# Patient Record
Sex: FEMALE | Race: WHITE | NOT HISPANIC OR LATINO
[De-identification: names, ages, dates, MRNs, and addresses within clinical notes are randomized per-mention and may not be internally consistent; named-entity substitution may affect disease eponyms.]

---

## 2017-07-28 ENCOUNTER — APPOINTMENT (OUTPATIENT)
Dept: CT IMAGING | Facility: CLINIC | Age: 62
End: 2017-07-28
Payer: COMMERCIAL

## 2017-07-28 ENCOUNTER — OUTPATIENT (OUTPATIENT)
Dept: OUTPATIENT SERVICES | Facility: HOSPITAL | Age: 62
LOS: 1 days | End: 2017-07-28
Payer: COMMERCIAL

## 2017-07-28 DIAGNOSIS — Z00.8 ENCOUNTER FOR OTHER GENERAL EXAMINATION: ICD-10-CM

## 2017-07-28 PROCEDURE — 70486 CT MAXILLOFACIAL W/O DYE: CPT

## 2017-07-28 PROCEDURE — 70486 CT MAXILLOFACIAL W/O DYE: CPT | Mod: 26

## 2018-10-08 ENCOUNTER — INPATIENT (INPATIENT)
Facility: HOSPITAL | Age: 63
LOS: 6 days | Discharge: HOME | DRG: 871 | End: 2018-10-15
Attending: HOSPITALIST | Admitting: HOSPITALIST
Payer: COMMERCIAL

## 2018-10-08 VITALS
WEIGHT: 227.96 LBS | OXYGEN SATURATION: 95 % | DIASTOLIC BLOOD PRESSURE: 95 MMHG | HEIGHT: 63 IN | HEART RATE: 93 BPM | SYSTOLIC BLOOD PRESSURE: 158 MMHG | RESPIRATION RATE: 16 BRPM | TEMPERATURE: 99 F

## 2018-10-08 LAB
ALBUMIN SERPL ELPH-MCNC: 3.8 G/DL — SIGNIFICANT CHANGE UP (ref 3.3–5)
ALP SERPL-CCNC: 76 U/L — SIGNIFICANT CHANGE UP (ref 40–120)
ALT FLD-CCNC: 27 U/L — SIGNIFICANT CHANGE UP (ref 10–45)
ANION GAP SERPL CALC-SCNC: 10 MMOL/L — SIGNIFICANT CHANGE UP (ref 5–17)
AST SERPL-CCNC: 23 U/L — SIGNIFICANT CHANGE UP (ref 10–40)
BASOPHILS # BLD AUTO: 0 K/UL — SIGNIFICANT CHANGE UP (ref 0–0.2)
BASOPHILS NFR BLD AUTO: 0.2 % — SIGNIFICANT CHANGE UP (ref 0–2)
BILIRUB SERPL-MCNC: 0.5 MG/DL — SIGNIFICANT CHANGE UP (ref 0.2–1.2)
BUN SERPL-MCNC: 11 MG/DL — SIGNIFICANT CHANGE UP (ref 7–23)
CALCIUM SERPL-MCNC: 9 MG/DL — SIGNIFICANT CHANGE UP (ref 8.4–10.5)
CHLORIDE SERPL-SCNC: 102 MMOL/L — SIGNIFICANT CHANGE UP (ref 96–108)
CO2 SERPL-SCNC: 26 MMOL/L — SIGNIFICANT CHANGE UP (ref 22–31)
CREAT SERPL-MCNC: 0.83 MG/DL — SIGNIFICANT CHANGE UP (ref 0.5–1.3)
EOSINOPHIL # BLD AUTO: 0.2 K/UL — SIGNIFICANT CHANGE UP (ref 0–0.5)
EOSINOPHIL NFR BLD AUTO: 2.1 % — SIGNIFICANT CHANGE UP (ref 0–6)
GAS PNL BLDV: SIGNIFICANT CHANGE UP
GLUCOSE SERPL-MCNC: 91 MG/DL — SIGNIFICANT CHANGE UP (ref 70–99)
HCT VFR BLD CALC: 38.8 % — SIGNIFICANT CHANGE UP (ref 34.5–45)
HGB BLD-MCNC: 12.9 G/DL — SIGNIFICANT CHANGE UP (ref 11.5–15.5)
LYMPHOCYTES # BLD AUTO: 1.5 K/UL — SIGNIFICANT CHANGE UP (ref 1–3.3)
LYMPHOCYTES # BLD AUTO: 18.4 % — SIGNIFICANT CHANGE UP (ref 13–44)
MCHC RBC-ENTMCNC: 28.1 PG — SIGNIFICANT CHANGE UP (ref 27–34)
MCHC RBC-ENTMCNC: 33.3 GM/DL — SIGNIFICANT CHANGE UP (ref 32–36)
MCV RBC AUTO: 84.2 FL — SIGNIFICANT CHANGE UP (ref 80–100)
MONOCYTES # BLD AUTO: 0.8 K/UL — SIGNIFICANT CHANGE UP (ref 0–0.9)
MONOCYTES NFR BLD AUTO: 10.6 % — SIGNIFICANT CHANGE UP (ref 2–14)
NEUTROPHILS # BLD AUTO: 5.5 K/UL — SIGNIFICANT CHANGE UP (ref 1.8–7.4)
NEUTROPHILS NFR BLD AUTO: 68.8 % — SIGNIFICANT CHANGE UP (ref 43–77)
PLATELET # BLD AUTO: 283 K/UL — SIGNIFICANT CHANGE UP (ref 150–400)
POTASSIUM SERPL-MCNC: 3.7 MMOL/L — SIGNIFICANT CHANGE UP (ref 3.5–5.3)
POTASSIUM SERPL-SCNC: 3.7 MMOL/L — SIGNIFICANT CHANGE UP (ref 3.5–5.3)
PROT SERPL-MCNC: 8.3 G/DL — SIGNIFICANT CHANGE UP (ref 6–8.3)
RBC # BLD: 4.6 M/UL — SIGNIFICANT CHANGE UP (ref 3.8–5.2)
RBC # FLD: 13.4 % — SIGNIFICANT CHANGE UP (ref 10.3–14.5)
SODIUM SERPL-SCNC: 138 MMOL/L — SIGNIFICANT CHANGE UP (ref 135–145)
WBC # BLD: 8 K/UL — SIGNIFICANT CHANGE UP (ref 3.8–10.5)
WBC # FLD AUTO: 8 K/UL — SIGNIFICANT CHANGE UP (ref 3.8–10.5)

## 2018-10-08 PROCEDURE — 99285 EMERGENCY DEPT VISIT HI MDM: CPT

## 2018-10-08 PROCEDURE — 71046 X-RAY EXAM CHEST 2 VIEWS: CPT | Mod: 26

## 2018-10-08 RX ORDER — ACETAMINOPHEN 500 MG
650 TABLET ORAL ONCE
Qty: 0 | Refills: 0 | Status: DISCONTINUED | OUTPATIENT
Start: 2018-10-08 | End: 2018-10-08

## 2018-10-08 RX ORDER — SODIUM CHLORIDE 9 MG/ML
1000 INJECTION INTRAMUSCULAR; INTRAVENOUS; SUBCUTANEOUS ONCE
Qty: 0 | Refills: 0 | Status: COMPLETED | OUTPATIENT
Start: 2018-10-08 | End: 2018-10-08

## 2018-10-08 RX ORDER — ACETAMINOPHEN 500 MG
650 TABLET ORAL ONCE
Qty: 0 | Refills: 0 | Status: COMPLETED | OUTPATIENT
Start: 2018-10-08 | End: 2018-10-08

## 2018-10-08 RX ORDER — SODIUM CHLORIDE 9 MG/ML
1000 INJECTION INTRAMUSCULAR; INTRAVENOUS; SUBCUTANEOUS ONCE
Qty: 0 | Refills: 0 | Status: DISCONTINUED | OUTPATIENT
Start: 2018-10-08 | End: 2018-10-08

## 2018-10-08 RX ADMIN — Medication 650 MILLIGRAM(S): at 22:25

## 2018-10-08 RX ADMIN — SODIUM CHLORIDE 1000 MILLILITER(S): 9 INJECTION INTRAMUSCULAR; INTRAVENOUS; SUBCUTANEOUS at 22:24

## 2018-10-08 RX ADMIN — Medication 100 MILLIGRAM(S): at 23:09

## 2018-10-08 NOTE — ED PROVIDER NOTE - PROGRESS NOTE DETAILS
Spoke to hospitalist to endorse admission, unable to reach Dr. Baker by phone for past two hours. Hospitalist deferring admission until they attempt to readh Dr. Baker themselves x 1.

## 2018-10-08 NOTE — ED PROVIDER NOTE - ATTENDING CONTRIBUTION TO CARE
61 y/o female with the above documented history and HPI who on exam appears well and comfortable. VSs noted, neck supple, lungs CTA, cardiac sounds s/ audible m/r/g, abdomen soft, NT/ND, extremities s/ asymmetry, calf ttp or palpable cord, skin s/ rash or edema and neurologically intact. Screening diagnostic studies obtained. Presentation c/w CAP. Curb-65 score suggest out-patient Tx but she did take 4 days of Augmentin (had at home for some other reason from approx 6 months ago) and is not improved and says she feels SOB when she walks. We will follow her remaining studies, reassess, ambulate and treat/dispo accordingly.

## 2018-10-08 NOTE — ED ADULT NURSE NOTE - OBJECTIVE STATEMENT
63y/o female walked into ED a&ox3 c/o SOB. Patient reports since Friday she has been having productive cough, SOB with exertion and chest discomfort. Also reports intermittent fevers which she has been taking Tylenol for every 6hours. Reports she has been taking left over amoxicillin that she had at home because she thought she may have a pneumonia. States she went to Urgent care yesterday where she was diagnosed with left lower lobe pneumonia. Sent here for further eval. Currently c/o pain to right upper back, cough and chest discomfort when she coughs. Denies dizziness, abd pain, vomiting, diarrhea, urinary symptoms, sick contacts, recent travel. Wheezing heard b/l to lungs. Abd soft, nontender, nondistended. MD at bedside for eval. 61y/o female walked into ED a&ox3 c/o SOB. Patient reports since Friday she has been having productive cough, SOB with exertion and chest discomfort. Also reports intermittent fevers which she has been taking Tylenol for every 6hours. Reports she has been taking left over amoxicillin that she had at home because she thought she may have a pneumonia. States she went to Urgent care yesterday where she was diagnosed with left lower lobe pneumonia. Sent here for further eval. Currently c/o pain to right upper back, cough and chest discomfort when she coughs. Denies dizziness, abd pain, vomiting, diarrhea, urinary symptoms, recent travel. Wheezing heard b/l to lungs. Abd soft, nontender, nondistended. MD at bedside for eval.

## 2018-10-08 NOTE — ED PROVIDER NOTE - SHIFT CHANGE DETAILS
Awaiting CXR and clinical reassessment. Regardless of her CXR findings, if she experiences BOLDEN and/or her SpO2 drops on ambulation, she may be admitted.

## 2018-10-09 DIAGNOSIS — J18.9 PNEUMONIA, UNSPECIFIED ORGANISM: ICD-10-CM

## 2018-10-09 DIAGNOSIS — J45.909 UNSPECIFIED ASTHMA, UNCOMPLICATED: ICD-10-CM

## 2018-10-09 DIAGNOSIS — A41.9 SEPSIS, UNSPECIFIED ORGANISM: ICD-10-CM

## 2018-10-09 DIAGNOSIS — G47.33 OBSTRUCTIVE SLEEP APNEA (ADULT) (PEDIATRIC): ICD-10-CM

## 2018-10-09 LAB — RAPID RVP RESULT: SIGNIFICANT CHANGE UP

## 2018-10-09 PROCEDURE — 12345: CPT | Mod: NC

## 2018-10-09 PROCEDURE — 99223 1ST HOSP IP/OBS HIGH 75: CPT

## 2018-10-09 PROCEDURE — 71250 CT THORAX DX C-: CPT | Mod: 26

## 2018-10-09 RX ORDER — ACETAMINOPHEN 500 MG
650 TABLET ORAL EVERY 6 HOURS
Qty: 0 | Refills: 0 | Status: DISCONTINUED | OUTPATIENT
Start: 2018-10-09 | End: 2018-10-15

## 2018-10-09 RX ORDER — IPRATROPIUM/ALBUTEROL SULFATE 18-103MCG
3 AEROSOL WITH ADAPTER (GRAM) INHALATION EVERY 4 HOURS
Qty: 0 | Refills: 0 | Status: DISCONTINUED | OUTPATIENT
Start: 2018-10-09 | End: 2018-10-15

## 2018-10-09 RX ORDER — BUDESONIDE AND FORMOTEROL FUMARATE DIHYDRATE 160; 4.5 UG/1; UG/1
2 AEROSOL RESPIRATORY (INHALATION)
Qty: 0 | Refills: 0 | Status: DISCONTINUED | OUTPATIENT
Start: 2018-10-09 | End: 2018-10-15

## 2018-10-09 RX ORDER — IPRATROPIUM/ALBUTEROL SULFATE 18-103MCG
3 AEROSOL WITH ADAPTER (GRAM) INHALATION EVERY 6 HOURS
Qty: 0 | Refills: 0 | Status: DISCONTINUED | OUTPATIENT
Start: 2018-10-09 | End: 2018-10-09

## 2018-10-09 RX ORDER — CEFTRIAXONE 500 MG/1
1 INJECTION, POWDER, FOR SOLUTION INTRAMUSCULAR; INTRAVENOUS ONCE
Qty: 0 | Refills: 0 | Status: COMPLETED | OUTPATIENT
Start: 2018-10-09 | End: 2018-10-09

## 2018-10-09 RX ORDER — ONDANSETRON 8 MG/1
4 TABLET, FILM COATED ORAL ONCE
Qty: 0 | Refills: 0 | Status: COMPLETED | OUTPATIENT
Start: 2018-10-09 | End: 2018-10-09

## 2018-10-09 RX ORDER — AZITHROMYCIN 500 MG/1
500 TABLET, FILM COATED ORAL ONCE
Qty: 0 | Refills: 0 | Status: COMPLETED | OUTPATIENT
Start: 2018-10-09 | End: 2018-10-09

## 2018-10-09 RX ORDER — ASPIRIN/CALCIUM CARB/MAGNESIUM 324 MG
81 TABLET ORAL DAILY
Qty: 0 | Refills: 0 | Status: DISCONTINUED | OUTPATIENT
Start: 2018-10-09 | End: 2018-10-15

## 2018-10-09 RX ADMIN — Medication 3 MILLILITER(S): at 15:04

## 2018-10-09 RX ADMIN — Medication 81 MILLIGRAM(S): at 12:03

## 2018-10-09 RX ADMIN — SODIUM CHLORIDE 1000 MILLILITER(S): 9 INJECTION INTRAMUSCULAR; INTRAVENOUS; SUBCUTANEOUS at 00:16

## 2018-10-09 RX ADMIN — Medication 3 MILLILITER(S): at 21:25

## 2018-10-09 RX ADMIN — AZITHROMYCIN 250 MILLIGRAM(S): 500 TABLET, FILM COATED ORAL at 01:07

## 2018-10-09 RX ADMIN — Medication 650 MILLIGRAM(S): at 00:15

## 2018-10-09 RX ADMIN — Medication 650 MILLIGRAM(S): at 12:33

## 2018-10-09 RX ADMIN — CEFTRIAXONE 100 GRAM(S): 500 INJECTION, POWDER, FOR SOLUTION INTRAMUSCULAR; INTRAVENOUS at 00:23

## 2018-10-09 RX ADMIN — Medication 3 MILLILITER(S): at 18:12

## 2018-10-09 RX ADMIN — Medication 650 MILLIGRAM(S): at 06:36

## 2018-10-09 RX ADMIN — CEFTRIAXONE 1 GRAM(S): 500 INJECTION, POWDER, FOR SOLUTION INTRAMUSCULAR; INTRAVENOUS at 01:07

## 2018-10-09 RX ADMIN — BUDESONIDE AND FORMOTEROL FUMARATE DIHYDRATE 2 PUFF(S): 160; 4.5 AEROSOL RESPIRATORY (INHALATION) at 22:37

## 2018-10-09 RX ADMIN — Medication 3 MILLILITER(S): at 11:48

## 2018-10-09 RX ADMIN — Medication 650 MILLIGRAM(S): at 03:42

## 2018-10-09 RX ADMIN — ONDANSETRON 4 MILLIGRAM(S): 8 TABLET, FILM COATED ORAL at 02:40

## 2018-10-09 RX ADMIN — Medication 650 MILLIGRAM(S): at 12:03

## 2018-10-09 RX ADMIN — AZITHROMYCIN 500 MILLIGRAM(S): 500 TABLET, FILM COATED ORAL at 02:41

## 2018-10-09 NOTE — H&P ADULT - PMH
Asthma, unspecified asthma severity, unspecified whether complicated, unspecified whether persistent    Bronchitis    JEFFRY (obstructive sleep apnea)

## 2018-10-09 NOTE — H&P ADULT - NSHPREVIEWOFSYSTEMS_GEN_ALL_CORE
REVIEW OF SYSTEMS:  CONSTITUTIONAL: +weakness. +fevers. +chills. No rigors. No weight loss. No poor appetite.  EYES: No visual changes. No eye pain.  ENT: No hearing difficulty. No vertigo. No dysphagia. No Sinusitis/rhinorrhea.  NECK: No pain. No stiffness/rigidity.  CARDIAC: No chest pain. No palpitations.  RESPIRATORY: +cough. +SOB. +hemoptysis.  GASTROINTESTINAL: No abdominal pain. +nausea. No vomiting. No hematemesis. +diarrhea. No constipation. No melena. No hematochezia.  GENITOURINARY: No dysuria. No frequency. No hesitancy. No hematuria.  NEUROLOGICAL: No numbness/tingling. No focal weakness. No incontinence. No headache.  BACK: +back pain.  EXTREMITIES: +lower extremity edema. Full ROM.  SKIN: No rashes. No itching. No other lesions.  PSYCHIATRIC: No depression. No anxiety. No SI/HI.  ALLERGIC: No lip swelling. No hives.  All other review of systems is negative unless indicated above.  Unless indicated above, unable to assess ROS 2/2 REVIEW OF SYSTEMS:  CONSTITUTIONAL: +weakness. +fevers. +chills. No rigors. No weight loss. No poor appetite.  EYES: No visual changes. No eye pain.  ENT: No hearing difficulty. No vertigo. No dysphagia. No Sinusitis/rhinorrhea.  NECK: No pain. No stiffness/rigidity.  CARDIAC: No chest pain. No palpitations.  RESPIRATORY: +cough. +SOB. +hemoptysis.  GASTROINTESTINAL: No abdominal pain. +nausea. No vomiting. No hematemesis. +diarrhea. No constipation. No melena. No hematochezia.  GENITOURINARY: No dysuria. No frequency. No hesitancy. No hematuria.  NEUROLOGICAL: No numbness/tingling. No focal weakness. No incontinence. No headache.  BACK: +back pain pleuritic  EXTREMITIES: +lower extremity edema. Full ROM.  SKIN: No rashes. No itching. No other lesions.  PSYCHIATRIC: No depression. No anxiety. No SI/HI.  ALLERGIC: No lip swelling. No hives.  All other review of systems is negative unless indicated above.  Unless indicated above, unable to assess ROS 2/2

## 2018-10-09 NOTE — H&P ADULT - PROBLEM SELECTOR PLAN 1
- s/p 5 doses augmentin, 1 dose azithro/ceft  - cont levaquin starting tomorrow  - CT chest pending to eval chronic SOB, small hemoptysis, and pna  - small hemoptysis likely 2/2 PNA. monitor clinically

## 2018-10-09 NOTE — H&P ADULT - NSHPPHYSICALEXAM_GEN_ALL_CORE
PHYSICAL EXAM:   GENERAL: Alert. Not confused. No acute distress. Not thin. Not cachectic. +obese.  HEAD:  Atraumatic. Normocephalic.  EYES: EOMI. PERRLA. Normal conjunctiva/sclera.  ENT: Neck supple. No JVD. Moist oral mucosa. Not edentulous. No thrush.  LYMPH: Normal supraclavicular/cervical lymph nodes.   CARDIAC: Not tachy, Not tyra. Regular rhythm. Not irregularly irregular. S1. S2. No murmur. No rub. No distant heart sounds.  LUNG/CHEST: +expiratory wheezes b/l. No rales. No rhonchi.  ABDOMEN: Soft. No tenderness. No distension. No fluid wave. Normal bowel sounds.  BACK: No midline/vertebral tenderness. No flank tenderness.  VASCULAR: +2 b/l radial or ulnar pulses. Palpable DP pulses.  EXTREMITIES:  No clubbing. No cyanosis. +2 nonpitting bl LE edema. Moving all 4.  NEUROLOGY: A&Ox3. Non-focal exam. Cranial nerves intact. Normal speech. Sensation intact.  PSYCH: Normal behavior. Normal affect.  SKIN: No jaundice. No erythema. No rash/lesion.  Vascular Access:     ICU Vital Signs Last 24 Hrs  T(C): 37 (09 Oct 2018 03:49), Max: 37.1 (08 Oct 2018 17:36)  T(F): 98.6 (09 Oct 2018 03:49), Max: 98.8 (08 Oct 2018 17:36)  HR: 81 (09 Oct 2018 03:49) (81 - 93)  BP: 145/72 (09 Oct 2018 03:49) (144/93 - 158/95)  BP(mean): --  ABP: --  ABP(mean): --  RR: 19 (09 Oct 2018 03:49) (16 - 20)  SpO2: 95% (09 Oct 2018 03:49) (95% - 95%)      I&O's Summary

## 2018-10-09 NOTE — H&P ADULT - NSHPLABSRESULTS_GEN_ALL_CORE
Personally reviewed labs.   Personally reviewed imaging.                           12.9   8.0   )-----------( 283      ( 08 Oct 2018 22:26 )             38.8       10-08    138  |  102  |  11  ----------------------------<  91  3.7   |  26  |  0.83    Ca    9.0      08 Oct 2018 22:26    TPro  8.3  /  Alb  3.8  /  TBili  0.5  /  DBili  x   /  AST  23  /  ALT  27  /  AlkPhos  76  10-08            LIVER FUNCTIONS - ( 08 Oct 2018 22:26 )  Alb: 3.8 g/dL / Pro: 8.3 g/dL / ALK PHOS: 76 U/L / ALT: 27 U/L / AST: 23 U/L / GGT: x

## 2018-10-09 NOTE — H&P ADULT - PROBLEM SELECTOR PLAN 2
- end expiratory wheezes, currently in exacerbation vs reactive airway dz vs COPD exacerbation  - cont duonebs + inhaled steroids

## 2018-10-09 NOTE — H&P ADULT - NSHPSOCIALHISTORY_GEN_ALL_CORE
Social History:    Marital Status: ( x ) , (  ) Single, (  ) , (  ) , (  )   # of Children: 3  Lives with: (  ) alone, (  ) children, ( x ) spouse, (  ) parents, (  ) siblings, (  ) friends, (  ) other:   Occupation: pre-K teacher    Substance Use/Illicit Drugs: (  ) never used vs other:   Tobacco Usage: (  ) never smoked, ( x ) former smoker, (  ) current smoker and Total Pack-Years: 3 years  Last Alcohol Usage/Frequency/Amount/Withdrawal/Hx of Abuse:  plum wine once a week  Foreign travel/Animal exposure:

## 2018-10-09 NOTE — CHART NOTE - NSCHARTNOTEFT_GEN_A_CORE
62F c hx bronchitis, asthma, obesity, JEFFRY on CPAP, chronic cough and chronic BOLDEN, pw sob, fever, small hemoptysis.    Patient seen and examined. Started on Levaquin, continue with nebs. Reviewed CT chest, show LOUIS opacity, likely infectious.   Follow up sputum cultures. Follow up Echo given b/l lower ext edema and Dyspnea.   Will continue to follow.

## 2018-10-09 NOTE — H&P ADULT - HISTORY OF PRESENT ILLNESS
62F c hx bronchitis, asthma, obesity, JEFFRY on CPAP, chronic cough and chronic BOLDEN, pw sob, fever, small hemoptysis.    Pt states she has chronic SOB/BOLDEN and cough for 2 years, for which she saw a pulmonologist and a cardiologist, and unclear etiology of the BOLDEN. Pt states she's never had PNA before and never had a CT of her chest. Pt reports 4 days of increasing BOLDEN, cough above baseline, with subjective fevers. Reports taking 5 total doses of augmentin that she self-medicated, as she had antibiotics left over from another time. Reports 1 day of small productive, pink colored sputum. Also reports a few days of intermittent diarrhea. Denies CP, but reports pleuritic back pain.    VS: Tm 98.8, P 93, /73, R 20, 95% RA  in the ED, received azithro, ceftriaxone, Ns 1L, zofran, tessalon perle, hycodan

## 2018-10-09 NOTE — ED ADULT NURSE REASSESSMENT NOTE - NS ED NURSE REASSESS COMMENT FT1
Received report from TIM Alfred. Pt A&Ox3, resting, VS stable. Safety checked. No c/o pain or discomfort at this time. Comfort care provided. Pt encouraged to call for assist when needed. admitted, pending bed assignment

## 2018-10-09 NOTE — H&P ADULT - ASSESSMENT
62F c hx bronchitis, asthma, obesity, JEFFRY on CPAP, chronic cough and chronic BOLDEN, pw sepsis 2/2 LOUIS PNA

## 2018-10-10 LAB
GRAM STN FLD: SIGNIFICANT CHANGE UP
SPECIMEN SOURCE: SIGNIFICANT CHANGE UP

## 2018-10-10 PROCEDURE — 93306 TTE W/DOPPLER COMPLETE: CPT | Mod: 26

## 2018-10-10 PROCEDURE — 99233 SBSQ HOSP IP/OBS HIGH 50: CPT

## 2018-10-10 RX ADMIN — Medication 3 MILLILITER(S): at 09:17

## 2018-10-10 RX ADMIN — Medication 100 MILLIGRAM(S): at 13:40

## 2018-10-10 RX ADMIN — Medication 20 MILLIGRAM(S): at 21:30

## 2018-10-10 RX ADMIN — BUDESONIDE AND FORMOTEROL FUMARATE DIHYDRATE 2 PUFF(S): 160; 4.5 AEROSOL RESPIRATORY (INHALATION) at 05:36

## 2018-10-10 RX ADMIN — Medication 3 MILLILITER(S): at 17:34

## 2018-10-10 RX ADMIN — Medication 3 MILLILITER(S): at 01:20

## 2018-10-10 RX ADMIN — Medication 3 MILLILITER(S): at 05:36

## 2018-10-10 RX ADMIN — Medication 100 MILLIGRAM(S): at 21:27

## 2018-10-10 RX ADMIN — Medication 20 MILLIGRAM(S): at 13:40

## 2018-10-10 RX ADMIN — BUDESONIDE AND FORMOTEROL FUMARATE DIHYDRATE 2 PUFF(S): 160; 4.5 AEROSOL RESPIRATORY (INHALATION) at 17:34

## 2018-10-10 RX ADMIN — Medication 3 MILLILITER(S): at 21:28

## 2018-10-10 RX ADMIN — Medication 81 MILLIGRAM(S): at 12:11

## 2018-10-10 RX ADMIN — Medication 3 MILLILITER(S): at 13:40

## 2018-10-10 RX ADMIN — Medication 650 MILLIGRAM(S): at 23:37

## 2018-10-11 LAB
ANION GAP SERPL CALC-SCNC: 13 MMOL/L — SIGNIFICANT CHANGE UP (ref 5–17)
BASOPHILS # BLD AUTO: 0 K/UL — SIGNIFICANT CHANGE UP (ref 0–0.2)
BASOPHILS NFR BLD AUTO: 0.1 % — SIGNIFICANT CHANGE UP (ref 0–2)
BUN SERPL-MCNC: 19 MG/DL — SIGNIFICANT CHANGE UP (ref 7–23)
CALCIUM SERPL-MCNC: 9.9 MG/DL — SIGNIFICANT CHANGE UP (ref 8.4–10.5)
CHLORIDE SERPL-SCNC: 102 MMOL/L — SIGNIFICANT CHANGE UP (ref 96–108)
CO2 SERPL-SCNC: 25 MMOL/L — SIGNIFICANT CHANGE UP (ref 22–31)
CREAT SERPL-MCNC: 1.2 MG/DL — SIGNIFICANT CHANGE UP (ref 0.5–1.3)
CULTURE RESULTS: SIGNIFICANT CHANGE UP
EOSINOPHIL # BLD AUTO: 0 K/UL — SIGNIFICANT CHANGE UP (ref 0–0.5)
EOSINOPHIL NFR BLD AUTO: 0.3 % — SIGNIFICANT CHANGE UP (ref 0–6)
GLUCOSE SERPL-MCNC: 119 MG/DL — HIGH (ref 70–99)
HCT VFR BLD CALC: 38 % — SIGNIFICANT CHANGE UP (ref 34.5–45)
HGB BLD-MCNC: 12.6 G/DL — SIGNIFICANT CHANGE UP (ref 11.5–15.5)
LYMPHOCYTES # BLD AUTO: 1 K/UL — SIGNIFICANT CHANGE UP (ref 1–3.3)
LYMPHOCYTES # BLD AUTO: 6 % — LOW (ref 13–44)
MCHC RBC-ENTMCNC: 27.9 PG — SIGNIFICANT CHANGE UP (ref 27–34)
MCHC RBC-ENTMCNC: 33.2 GM/DL — SIGNIFICANT CHANGE UP (ref 32–36)
MCV RBC AUTO: 84 FL — SIGNIFICANT CHANGE UP (ref 80–100)
MONOCYTES # BLD AUTO: 0.7 K/UL — SIGNIFICANT CHANGE UP (ref 0–0.9)
MONOCYTES NFR BLD AUTO: 4.1 % — SIGNIFICANT CHANGE UP (ref 2–14)
NEUTROPHILS # BLD AUTO: 15.4 K/UL — HIGH (ref 1.8–7.4)
NEUTROPHILS NFR BLD AUTO: 89.5 % — HIGH (ref 43–77)
PLATELET # BLD AUTO: 350 K/UL — SIGNIFICANT CHANGE UP (ref 150–400)
POTASSIUM SERPL-MCNC: 4.3 MMOL/L — SIGNIFICANT CHANGE UP (ref 3.5–5.3)
POTASSIUM SERPL-SCNC: 4.3 MMOL/L — SIGNIFICANT CHANGE UP (ref 3.5–5.3)
RBC # BLD: 4.52 M/UL — SIGNIFICANT CHANGE UP (ref 3.8–5.2)
RBC # FLD: 13.5 % — SIGNIFICANT CHANGE UP (ref 10.3–14.5)
SODIUM SERPL-SCNC: 140 MMOL/L — SIGNIFICANT CHANGE UP (ref 135–145)
SPECIMEN SOURCE: SIGNIFICANT CHANGE UP
WBC # BLD: 17.2 K/UL — HIGH (ref 3.8–10.5)
WBC # FLD AUTO: 17.2 K/UL — HIGH (ref 3.8–10.5)

## 2018-10-11 PROCEDURE — 99232 SBSQ HOSP IP/OBS MODERATE 35: CPT

## 2018-10-11 RX ADMIN — Medication 3 MILLILITER(S): at 13:02

## 2018-10-11 RX ADMIN — Medication 3 MILLILITER(S): at 18:25

## 2018-10-11 RX ADMIN — Medication 100 MILLIGRAM(S): at 22:35

## 2018-10-11 RX ADMIN — Medication 20 MILLIGRAM(S): at 22:35

## 2018-10-11 RX ADMIN — Medication 20 MILLIGRAM(S): at 05:48

## 2018-10-11 RX ADMIN — Medication 650 MILLIGRAM(S): at 09:51

## 2018-10-11 RX ADMIN — Medication 100 MILLIGRAM(S): at 13:02

## 2018-10-11 RX ADMIN — Medication 20 MILLIGRAM(S): at 13:01

## 2018-10-11 RX ADMIN — Medication 3 MILLILITER(S): at 05:49

## 2018-10-11 RX ADMIN — Medication 3 MILLILITER(S): at 09:19

## 2018-10-11 RX ADMIN — Medication 81 MILLIGRAM(S): at 13:02

## 2018-10-11 RX ADMIN — BUDESONIDE AND FORMOTEROL FUMARATE DIHYDRATE 2 PUFF(S): 160; 4.5 AEROSOL RESPIRATORY (INHALATION) at 18:26

## 2018-10-11 RX ADMIN — Medication 650 MILLIGRAM(S): at 08:51

## 2018-10-11 RX ADMIN — BUDESONIDE AND FORMOTEROL FUMARATE DIHYDRATE 2 PUFF(S): 160; 4.5 AEROSOL RESPIRATORY (INHALATION) at 05:49

## 2018-10-11 RX ADMIN — Medication 100 MILLIGRAM(S): at 05:48

## 2018-10-11 RX ADMIN — Medication 3 MILLILITER(S): at 22:36

## 2018-10-11 RX ADMIN — Medication 650 MILLIGRAM(S): at 00:20

## 2018-10-12 DIAGNOSIS — R09.1 PLEURISY: ICD-10-CM

## 2018-10-12 LAB
ANION GAP SERPL CALC-SCNC: 10 MMOL/L — SIGNIFICANT CHANGE UP (ref 5–17)
BASOPHILS # BLD AUTO: 0.2 K/UL — SIGNIFICANT CHANGE UP (ref 0–0.2)
BASOPHILS NFR BLD AUTO: 1 % — SIGNIFICANT CHANGE UP (ref 0–2)
BUN SERPL-MCNC: 24 MG/DL — HIGH (ref 7–23)
CALCIUM SERPL-MCNC: 9.4 MG/DL — SIGNIFICANT CHANGE UP (ref 8.4–10.5)
CHLORIDE SERPL-SCNC: 102 MMOL/L — SIGNIFICANT CHANGE UP (ref 96–108)
CO2 SERPL-SCNC: 24 MMOL/L — SIGNIFICANT CHANGE UP (ref 22–31)
CREAT SERPL-MCNC: 0.95 MG/DL — SIGNIFICANT CHANGE UP (ref 0.5–1.3)
EOSINOPHIL # BLD AUTO: 0.1 K/UL — SIGNIFICANT CHANGE UP (ref 0–0.5)
EOSINOPHIL NFR BLD AUTO: 0.4 % — SIGNIFICANT CHANGE UP (ref 0–6)
GLUCOSE SERPL-MCNC: 135 MG/DL — HIGH (ref 70–99)
HCT VFR BLD CALC: 37.2 % — SIGNIFICANT CHANGE UP (ref 34.5–45)
HCT VFR BLD CALC: 37.6 % — SIGNIFICANT CHANGE UP (ref 34.5–45)
HGB BLD-MCNC: 12.1 G/DL — SIGNIFICANT CHANGE UP (ref 11.5–15.5)
HGB BLD-MCNC: 12.3 G/DL — SIGNIFICANT CHANGE UP (ref 11.5–15.5)
LYMPHOCYTES # BLD AUTO: 1 K/UL — SIGNIFICANT CHANGE UP (ref 1–3.3)
LYMPHOCYTES # BLD AUTO: 6.1 % — LOW (ref 13–44)
MAGNESIUM SERPL-MCNC: 2.2 MG/DL — SIGNIFICANT CHANGE UP (ref 1.6–2.6)
MCHC RBC-ENTMCNC: 27.2 PG — SIGNIFICANT CHANGE UP (ref 27–34)
MCHC RBC-ENTMCNC: 27.8 PG — SIGNIFICANT CHANGE UP (ref 27–34)
MCHC RBC-ENTMCNC: 32.2 GM/DL — SIGNIFICANT CHANGE UP (ref 32–36)
MCHC RBC-ENTMCNC: 32.9 GM/DL — SIGNIFICANT CHANGE UP (ref 32–36)
MCV RBC AUTO: 84.4 FL — SIGNIFICANT CHANGE UP (ref 80–100)
MCV RBC AUTO: 84.5 FL — SIGNIFICANT CHANGE UP (ref 80–100)
MONOCYTES # BLD AUTO: 0.5 K/UL — SIGNIFICANT CHANGE UP (ref 0–0.9)
MONOCYTES NFR BLD AUTO: 2.9 % — SIGNIFICANT CHANGE UP (ref 2–14)
NEUTROPHILS # BLD AUTO: 14.4 K/UL — HIGH (ref 1.8–7.4)
NEUTROPHILS NFR BLD AUTO: 89.6 % — HIGH (ref 43–77)
PLATELET # BLD AUTO: 349 K/UL — SIGNIFICANT CHANGE UP (ref 150–400)
PLATELET # BLD AUTO: 352 K/UL — SIGNIFICANT CHANGE UP (ref 150–400)
POTASSIUM SERPL-MCNC: 4.8 MMOL/L — SIGNIFICANT CHANGE UP (ref 3.5–5.3)
POTASSIUM SERPL-SCNC: 4.8 MMOL/L — SIGNIFICANT CHANGE UP (ref 3.5–5.3)
RBC # BLD: 4.4 M/UL — SIGNIFICANT CHANGE UP (ref 3.8–5.2)
RBC # BLD: 4.46 M/UL — SIGNIFICANT CHANGE UP (ref 3.8–5.2)
RBC # FLD: 13.5 % — SIGNIFICANT CHANGE UP (ref 10.3–14.5)
RBC # FLD: 13.6 % — SIGNIFICANT CHANGE UP (ref 10.3–14.5)
SODIUM SERPL-SCNC: 136 MMOL/L — SIGNIFICANT CHANGE UP (ref 135–145)
WBC # BLD: 16.1 K/UL — HIGH (ref 3.8–10.5)
WBC # BLD: 16.2 K/UL — HIGH (ref 3.8–10.5)
WBC # FLD AUTO: 16.1 K/UL — HIGH (ref 3.8–10.5)
WBC # FLD AUTO: 16.2 K/UL — HIGH (ref 3.8–10.5)

## 2018-10-12 PROCEDURE — 71045 X-RAY EXAM CHEST 1 VIEW: CPT | Mod: 26

## 2018-10-12 PROCEDURE — 99232 SBSQ HOSP IP/OBS MODERATE 35: CPT

## 2018-10-12 RX ADMIN — Medication 20 MILLIGRAM(S): at 14:15

## 2018-10-12 RX ADMIN — Medication 20 MILLIGRAM(S): at 22:04

## 2018-10-12 RX ADMIN — BUDESONIDE AND FORMOTEROL FUMARATE DIHYDRATE 2 PUFF(S): 160; 4.5 AEROSOL RESPIRATORY (INHALATION) at 18:19

## 2018-10-12 RX ADMIN — Medication 650 MILLIGRAM(S): at 23:07

## 2018-10-12 RX ADMIN — Medication 100 MILLIGRAM(S): at 14:15

## 2018-10-12 RX ADMIN — Medication 81 MILLIGRAM(S): at 11:21

## 2018-10-12 RX ADMIN — Medication 100 MILLIGRAM(S): at 06:22

## 2018-10-12 RX ADMIN — BUDESONIDE AND FORMOTEROL FUMARATE DIHYDRATE 2 PUFF(S): 160; 4.5 AEROSOL RESPIRATORY (INHALATION) at 06:22

## 2018-10-12 RX ADMIN — Medication 3 MILLILITER(S): at 22:04

## 2018-10-12 RX ADMIN — Medication 3 MILLILITER(S): at 11:21

## 2018-10-12 RX ADMIN — Medication 3 MILLILITER(S): at 02:22

## 2018-10-12 RX ADMIN — Medication 1200 MILLIGRAM(S): at 18:19

## 2018-10-12 RX ADMIN — Medication 3 MILLILITER(S): at 15:32

## 2018-10-12 RX ADMIN — Medication 20 MILLIGRAM(S): at 06:22

## 2018-10-12 RX ADMIN — Medication 650 MILLIGRAM(S): at 23:40

## 2018-10-12 NOTE — CONSULT NOTE ADULT - PROBLEM SELECTOR PROBLEM 3
Asthma, unspecified asthma severity, unspecified whether complicated, unspecified whether persistent JEFFRY (obstructive sleep apnea)

## 2018-10-12 NOTE — CONSULT NOTE ADULT - ASSESSMENT
61 y/o F with PMH of asthma, JEFFRY on CPAP, chronic cough (x2 years) presents 10/9 with 3 day history of shortness of breath, fever/chills. Found to have dense LOUIS PNA. She receieved 3 days of Augmentin as outpt, now day 5 of levaquin. Patient continues to have dyspnea with exertion, dry cough with occasional yellow-green phlegm, pain on L side while laying, epigastric pain. Still dyspneic. Called to sandra.

## 2018-10-12 NOTE — CONSULT NOTE ADULT - PROBLEM SELECTOR RECOMMENDATION 9
Dense LOUIS PNA but no leukocytosis on admission. Leukocytosis now is likely steroid induced   -Complete 7 days of Levaquin (Day 5)  -Check urine legionella   -Repeat CXR today  -Duoneb q6  -Doubt wheeze is bronchospasm, likely insupated secretions in dense LOUIS PNA. Would rapidly taper steroids given concern for bacterial PNA  -Mucinex BID  -Mucomyst q6 with duoneb   -d/c Tessalon, hycodan qHS only Dense LOUIS PNA but no leukocytosis on admission. Leukocytosis now is possibly steroid induced   -Complete 8 days of Levaquin (Day 5)  -Check urine legionella   -Repeat CXR today  -Duoneb q6  -Doubt wheeze is bronchospasm, likely insipated secretions in dense LOUIS PNA.   -Mucinex BID  -Mucomyst q6 with duoneb   Change to Prednisone in am and taper over 1 week  -d/c Tessalon, hycodan qHS only

## 2018-10-12 NOTE — CONSULT NOTE ADULT - SUBJECTIVE AND OBJECTIVE BOX
PULMONARY CONSULT    HPI: 63 y/o F with PMH of asthma, JEFFRY on CPAP, chronic cough (x2 years) presents 10/9 with 3 day history of shortness of breath, fever/chills. Found to have dense LOUIS PNA. She receieved 3 days of Augmentin as outpt, now day 5 of levaquin. Patient continues to have dyspnea with exertion, dry cough with occasional yellow-green phlegm, pain on L side while laying, epigastric pain. Still dyspneic. Called to eval.       PAST MEDICAL & SURGICAL HISTORY:  Asthma, unspecified asthma severity, unspecified whether complicated, unspecified whether persistent  Bronchitis  JEFFRY (obstructive sleep apnea)  No significant past surgical history    Allergies    ammonia, peroxide, hair dye (Rash)  No Known Drug Allergies    Intolerances      FAMILY HISTORY:  No pertinent family history in first degree relatives    Social history: Former, remote smoker  Smoked x3-4 years, quit age 22    Review of Systems:  CONSTITUTIONAL: No fever, chills, or fatigue  EYES: No eye pain, visual disturbances, or discharge  ENMT:  No difficulty hearing, tinnitus, vertigo; No sinus or throat pain  NECK: No pain or stiffness  RESPIRATORY: Per above  CARDIOVASCULAR: No chest pain, palpitations, dizziness, or leg swelling  GASTROINTESTINAL: No abdominal or epigastric pain. No nausea, vomiting, or hematemesis; No diarrhea or constipation. No melena or hematochezia.  GENITOURINARY: No dysuria, frequency, hematuria, or incontinence  NEUROLOGICAL: No headaches, memory loss, loss of strength, numbness, or tremors  SKIN: No itching, burning, rashes, or lesions   MUSCULOSKELETAL: No joint pain or swelling; No muscle, back, or extremity pain  PSYCHIATRIC: No depression, anxiety, mood swings, or difficulty sleeping      Medications:  MEDICATIONS  (STANDING):  ALBUTerol/ipratropium for Nebulization 3 milliLiter(s) Nebulizer every 4 hours  aspirin enteric coated 81 milliGRAM(s) Oral daily  benzonatate 100 milliGRAM(s) Oral every 8 hours  buDESOnide  80 MICROgram(s)/formoterol 4.5 MICROgram(s) Inhaler 2 Puff(s) Inhalation two times a day  levoFLOXacin IVPB 750 milliGRAM(s) IV Intermittent every 24 hours  methylPREDNISolone sodium succinate Injectable 20 milliGRAM(s) IV Push every 8 hours    MEDICATIONS  (PRN):  acetaminophen   Tablet .. 650 milliGRAM(s) Oral every 6 hours PRN Moderate Pain (4 - 6)  HYDROcodone/homatropine Syrup 5 milliLiter(s) Oral every 6 hours PRN Cough            Vital Signs Last 24 Hrs  T(C): 36.7 (12 Oct 2018 10:00), Max: 36.8 (12 Oct 2018 04:28)  T(F): 98 (12 Oct 2018 10:00), Max: 98.2 (12 Oct 2018 04:28)  HR: 84 (12 Oct 2018 10:00) (80 - 84)  BP: 134/83 (12 Oct 2018 10:00) (131/73 - 144/93)  BP(mean): --  RR: 20 (12 Oct 2018 10:00) (18 - 20)  SpO2: 94% (12 Oct 2018 10:00) (94% - 94%) on RA            10-11 @ 07:01  -  10-12 @ 07:00  --------------------------------------------------------  IN: 240 mL / OUT: 0 mL / NET: 240 mL          LABS:                        12.3   16.2  )-----------( 349      ( 12 Oct 2018 06:06 )             37.2     10-12    136  |  102  |  24<H>  ----------------------------<  135<H>  4.8   |  24  |  0.95    Ca    9.4      12 Oct 2018 06:06  Mg     2.2     10-12            CAPILLARY BLOOD GLUCOSE                          CULTURES: (if applicable)  Culture Results:   Normal Respiratory Michelle present (10-09 @ 21:57)  Culture Results:   No growth to date. (10-09 @ 17:48)  Culture Results:   No growth to date. (10-09 @ 17:48)        Physical Examination:    General: No acute distress.      HEENT: Pupils equal, reactive to light.  Symmetric.    PULM: Exp wheeze LOUIS, LLL   CVS: S1, S2    ABD: Soft, nondistended, nontender, normoactive bowel sounds, no masses    EXT: No edema, nontender    SKIN: Warm and well perfused, no rashes noted.    NEURO: Alert, oriented, interactive, nonfocal    RADIOLOGY REVIEWED  CT chest: < from: CT Chest No Cont (10.09.18 @ 07:40) >  CHEST:     LUNGS AND LARGE AIRWAYS: Patent central airways.  Focal consolidation of   the left upper lobe. Small focus of tree-in-bud nodularity in the left   lower lobe. Recommend follow-up imaging in 4-6 weeks to ensure resolution.    PLEURA: Trace left pleural effusion.    VESSELS: Atherosclerotic changes.    HEART: Heart size is normal. No pericardial effusion. Coronary   calcifications.    MEDIASTINUM AND JENNA: No lymphadenopathy.    CHEST WALL AND LOWER NECK: Within normal limits.    VISUALIZED UPPER ABDOMEN: Colonic diverticulosis.    BONES: Within normal limits.    IMPRESSION: Focal consolidation of the left upper lobe and tree-in-bud   nodularity in the left lower lobe, likely infectious. No empyema or   abscess. Recommend follow-up imaging in 4-6 weeks to ensure resolution.    < end of copied text > PULMONARY CONSULT    HPI: 63 y/o F with PMH of asthma, JEFFRY on CPAP, chronic cough (x2 years) presents 10/9 with 3 day history of shortness of breath, fever/chills. Found to have dense LOUIS PNA. She receieved 3 days of Augmentin as outpt, now day 5 of levaquin. Patient continues to have dyspnea with exertion, dry cough with occasional yellow-green phlegm, pain on L side while laying, epigastric pain. Still dyspneic. Called to eval.       PAST MEDICAL & SURGICAL HISTORY:  Asthma, unspecified asthma severity, unspecified whether complicated, unspecified whether persistent  Bronchitis  JEFFRY (obstructive sleep apnea)  No significant past surgical history    Allergies    ammonia, peroxide, hair dye (Rash)  No Known Drug Allergies    Intolerances      FAMILY HISTORY:  No pertinent family history in first degree relatives    Social history: Former, remote smoker  Smoked x3-4 years, quit age 22    Review of Systems:  CONSTITUTIONAL: No fever, chills, or fatigue  EYES: No eye pain, visual disturbances, or discharge  ENMT:  No difficulty hearing, tinnitus, vertigo; No sinus or throat pain  NECK: No pain or stiffness  RESPIRATORY: Per above  CARDIOVASCULAR: No chest pain, palpitations, dizziness, or leg swelling  GASTROINTESTINAL: No abdominal or epigastric pain. No nausea, vomiting, or hematemesis; No diarrhea or constipation. No melena or hematochezia.  GENITOURINARY: No dysuria, frequency, hematuria, or incontinence  NEUROLOGICAL: No headaches, memory loss, loss of strength, numbness, or tremors  SKIN: No itching, burning, rashes, or lesions   MUSCULOSKELETAL: No joint pain or swelling; No muscle, back, or extremity pain  PSYCHIATRIC: No depression, anxiety, mood swings, or difficulty sleeping      Medications:  MEDICATIONS  (STANDING):  ALBUTerol/ipratropium for Nebulization 3 milliLiter(s) Nebulizer every 4 hours  aspirin enteric coated 81 milliGRAM(s) Oral daily  benzonatate 100 milliGRAM(s) Oral every 8 hours  buDESOnide  80 MICROgram(s)/formoterol 4.5 MICROgram(s) Inhaler 2 Puff(s) Inhalation two times a day  levoFLOXacin IVPB 750 milliGRAM(s) IV Intermittent every 24 hours  methylPREDNISolone sodium succinate Injectable 20 milliGRAM(s) IV Push every 8 hours    MEDICATIONS  (PRN):  acetaminophen   Tablet .. 650 milliGRAM(s) Oral every 6 hours PRN Moderate Pain (4 - 6)  HYDROcodone/homatropine Syrup 5 milliLiter(s) Oral every 6 hours PRN Cough            Vital Signs Last 24 Hrs  T(C): 36.7 (12 Oct 2018 10:00), Max: 36.8 (12 Oct 2018 04:28)  T(F): 98 (12 Oct 2018 10:00), Max: 98.2 (12 Oct 2018 04:28)  HR: 84 (12 Oct 2018 10:00) (80 - 84)  BP: 134/83 (12 Oct 2018 10:00) (131/73 - 144/93)  BP(mean): --  RR: 20 (12 Oct 2018 10:00) (18 - 20)  SpO2: 94% (12 Oct 2018 10:00) (94% - 94%) on RA            10-11 @ 07:01  -  10-12 @ 07:00  --------------------------------------------------------  IN: 240 mL / OUT: 0 mL / NET: 240 mL          LABS:                        12.3   16.2  )-----------( 349      ( 12 Oct 2018 06:06 )             37.2     10-12    136  |  102  |  24<H>  ----------------------------<  135<H>  4.8   |  24  |  0.95    Ca    9.4      12 Oct 2018 06:06  Mg     2.2     10-12            CAPILLARY BLOOD GLUCOSE                          CULTURES: (if applicable)  Culture Results:   Normal Respiratory Michelle present (10-09 @ 21:57)  Culture Results:   No growth to date. (10-09 @ 17:48)  Culture Results:   No growth to date. (10-09 @ 17:48)        Physical Examination:    General: No acute distress.      HEENT: Pupils equal, reactive to light.  Symmetric.    PULM: Ronchi L>R, mild Exp wheeze LOUIS, LLL   CVS: S1, S2    ABD: Soft, nondistended, nontender, normoactive bowel sounds, no masses    EXT: No edema, nontender    SKIN: Warm and well perfused, no rashes noted.    NEURO: Alert, oriented, interactive, nonfocal    RADIOLOGY REVIEWED  CT chest: < from: CT Chest No Cont (10.09.18 @ 07:40) >  CHEST:     LUNGS AND LARGE AIRWAYS: Patent central airways.  Focal consolidation of   the left upper lobe. Small focus of tree-in-bud nodularity in the left   lower lobe. Recommend follow-up imaging in 4-6 weeks to ensure resolution.    PLEURA: Trace left pleural effusion.    VESSELS: Atherosclerotic changes.    HEART: Heart size is normal. No pericardial effusion. Coronary   calcifications.    MEDIASTINUM AND JENNA: No lymphadenopathy.    CHEST WALL AND LOWER NECK: Within normal limits.    VISUALIZED UPPER ABDOMEN: Colonic diverticulosis.    BONES: Within normal limits.    IMPRESSION: Focal consolidation of the left upper lobe and tree-in-bud   nodularity in the left lower lobe, likely infectious. No empyema or   abscess. Recommend follow-up imaging in 4-6 weeks to ensure resolution.    < end of copied text >

## 2018-10-13 LAB — PROCALCITONIN SERPL-MCNC: 0.03 NG/ML — SIGNIFICANT CHANGE UP (ref 0.02–0.1)

## 2018-10-13 PROCEDURE — 99232 SBSQ HOSP IP/OBS MODERATE 35: CPT

## 2018-10-13 RX ADMIN — Medication 81 MILLIGRAM(S): at 11:29

## 2018-10-13 RX ADMIN — BUDESONIDE AND FORMOTEROL FUMARATE DIHYDRATE 2 PUFF(S): 160; 4.5 AEROSOL RESPIRATORY (INHALATION) at 06:30

## 2018-10-13 RX ADMIN — BUDESONIDE AND FORMOTEROL FUMARATE DIHYDRATE 2 PUFF(S): 160; 4.5 AEROSOL RESPIRATORY (INHALATION) at 18:14

## 2018-10-13 RX ADMIN — Medication 3 MILLILITER(S): at 03:11

## 2018-10-13 RX ADMIN — Medication 650 MILLIGRAM(S): at 11:28

## 2018-10-13 RX ADMIN — Medication 3 MILLILITER(S): at 14:34

## 2018-10-13 RX ADMIN — Medication 650 MILLIGRAM(S): at 13:25

## 2018-10-13 RX ADMIN — Medication 1200 MILLIGRAM(S): at 18:53

## 2018-10-13 RX ADMIN — Medication 3 MILLILITER(S): at 10:21

## 2018-10-13 RX ADMIN — Medication 20 MILLIGRAM(S): at 06:30

## 2018-10-13 RX ADMIN — Medication 40 MILLIGRAM(S): at 18:13

## 2018-10-13 RX ADMIN — Medication 3 MILLILITER(S): at 21:55

## 2018-10-13 RX ADMIN — Medication 1200 MILLIGRAM(S): at 06:29

## 2018-10-13 RX ADMIN — Medication 3 MILLILITER(S): at 18:14

## 2018-10-13 RX ADMIN — Medication 3 MILLILITER(S): at 06:29

## 2018-10-14 LAB
CULTURE RESULTS: SIGNIFICANT CHANGE UP
CULTURE RESULTS: SIGNIFICANT CHANGE UP
SPECIMEN SOURCE: SIGNIFICANT CHANGE UP
SPECIMEN SOURCE: SIGNIFICANT CHANGE UP

## 2018-10-14 PROCEDURE — 99232 SBSQ HOSP IP/OBS MODERATE 35: CPT

## 2018-10-14 RX ADMIN — Medication 1200 MILLIGRAM(S): at 17:22

## 2018-10-14 RX ADMIN — Medication 3 MILLILITER(S): at 21:29

## 2018-10-14 RX ADMIN — Medication 3 MILLILITER(S): at 10:49

## 2018-10-14 RX ADMIN — Medication 40 MILLIGRAM(S): at 05:04

## 2018-10-14 RX ADMIN — Medication 1200 MILLIGRAM(S): at 05:04

## 2018-10-14 RX ADMIN — Medication 3 MILLILITER(S): at 14:00

## 2018-10-14 RX ADMIN — BUDESONIDE AND FORMOTEROL FUMARATE DIHYDRATE 2 PUFF(S): 160; 4.5 AEROSOL RESPIRATORY (INHALATION) at 17:23

## 2018-10-14 RX ADMIN — Medication 3 MILLILITER(S): at 17:22

## 2018-10-14 RX ADMIN — BUDESONIDE AND FORMOTEROL FUMARATE DIHYDRATE 2 PUFF(S): 160; 4.5 AEROSOL RESPIRATORY (INHALATION) at 05:04

## 2018-10-14 RX ADMIN — Medication 3 MILLILITER(S): at 05:05

## 2018-10-14 RX ADMIN — Medication 81 MILLIGRAM(S): at 12:49

## 2018-10-14 NOTE — PROGRESS NOTE ADULT - PROBLEM SELECTOR PLAN 4
on steorid as well as BD
- cont nocturnal cpap @ home setting 6 cm2

## 2018-10-15 ENCOUNTER — TRANSCRIPTION ENCOUNTER (OUTPATIENT)
Age: 63
End: 2018-10-15

## 2018-10-15 VITALS
DIASTOLIC BLOOD PRESSURE: 79 MMHG | TEMPERATURE: 98 F | OXYGEN SATURATION: 96 % | SYSTOLIC BLOOD PRESSURE: 125 MMHG | HEART RATE: 67 BPM | RESPIRATION RATE: 18 BRPM

## 2018-10-15 DIAGNOSIS — Z29.9 ENCOUNTER FOR PROPHYLACTIC MEASURES, UNSPECIFIED: ICD-10-CM

## 2018-10-15 LAB
ANION GAP SERPL CALC-SCNC: 12 MMOL/L — SIGNIFICANT CHANGE UP (ref 5–17)
BASOPHILS # BLD AUTO: 0.02 K/UL — SIGNIFICANT CHANGE UP (ref 0–0.2)
BASOPHILS NFR BLD AUTO: 0.2 % — SIGNIFICANT CHANGE UP (ref 0–2)
BUN SERPL-MCNC: 25 MG/DL — HIGH (ref 7–23)
CALCIUM SERPL-MCNC: 8.7 MG/DL — SIGNIFICANT CHANGE UP (ref 8.4–10.5)
CHLORIDE SERPL-SCNC: 103 MMOL/L — SIGNIFICANT CHANGE UP (ref 96–108)
CO2 SERPL-SCNC: 23 MMOL/L — SIGNIFICANT CHANGE UP (ref 22–31)
CREAT SERPL-MCNC: 0.96 MG/DL — SIGNIFICANT CHANGE UP (ref 0.5–1.3)
EOSINOPHIL # BLD AUTO: 0.11 K/UL — SIGNIFICANT CHANGE UP (ref 0–0.5)
EOSINOPHIL NFR BLD AUTO: 0.8 % — SIGNIFICANT CHANGE UP (ref 0–6)
GLUCOSE SERPL-MCNC: 101 MG/DL — HIGH (ref 70–99)
HCT VFR BLD CALC: 41.2 % — SIGNIFICANT CHANGE UP (ref 34.5–45)
HGB BLD-MCNC: 12.8 G/DL — SIGNIFICANT CHANGE UP (ref 11.5–15.5)
IMM GRANULOCYTES NFR BLD AUTO: 2 % — HIGH (ref 0–1.5)
LYMPHOCYTES # BLD AUTO: 28.9 % — SIGNIFICANT CHANGE UP (ref 13–44)
LYMPHOCYTES # BLD AUTO: 3.83 K/UL — HIGH (ref 1–3.3)
MCHC RBC-ENTMCNC: 26.9 PG — LOW (ref 27–34)
MCHC RBC-ENTMCNC: 31.1 GM/DL — LOW (ref 32–36)
MCV RBC AUTO: 86.6 FL — SIGNIFICANT CHANGE UP (ref 80–100)
MONOCYTES # BLD AUTO: 1.01 K/UL — HIGH (ref 0–0.9)
MONOCYTES NFR BLD AUTO: 7.6 % — SIGNIFICANT CHANGE UP (ref 2–14)
NEUTROPHILS # BLD AUTO: 8.03 K/UL — HIGH (ref 1.8–7.4)
NEUTROPHILS NFR BLD AUTO: 60.5 % — SIGNIFICANT CHANGE UP (ref 43–77)
PLATELET # BLD AUTO: 371 K/UL — SIGNIFICANT CHANGE UP (ref 150–400)
POTASSIUM SERPL-MCNC: 4.1 MMOL/L — SIGNIFICANT CHANGE UP (ref 3.5–5.3)
POTASSIUM SERPL-SCNC: 4.1 MMOL/L — SIGNIFICANT CHANGE UP (ref 3.5–5.3)
RBC # BLD: 4.76 M/UL — SIGNIFICANT CHANGE UP (ref 3.8–5.2)
RBC # FLD: 15.8 % — HIGH (ref 10.3–14.5)
SODIUM SERPL-SCNC: 138 MMOL/L — SIGNIFICANT CHANGE UP (ref 135–145)
WBC # BLD: 13.27 K/UL — HIGH (ref 3.8–10.5)
WBC # FLD AUTO: 13.27 K/UL — HIGH (ref 3.8–10.5)

## 2018-10-15 PROCEDURE — 94660 CPAP INITIATION&MGMT: CPT

## 2018-10-15 PROCEDURE — 84132 ASSAY OF SERUM POTASSIUM: CPT

## 2018-10-15 PROCEDURE — 87581 M.PNEUMON DNA AMP PROBE: CPT

## 2018-10-15 PROCEDURE — 84145 PROCALCITONIN (PCT): CPT

## 2018-10-15 PROCEDURE — 96361 HYDRATE IV INFUSION ADD-ON: CPT

## 2018-10-15 PROCEDURE — C8929: CPT

## 2018-10-15 PROCEDURE — 84295 ASSAY OF SERUM SODIUM: CPT

## 2018-10-15 PROCEDURE — 71045 X-RAY EXAM CHEST 1 VIEW: CPT

## 2018-10-15 PROCEDURE — 82565 ASSAY OF CREATININE: CPT

## 2018-10-15 PROCEDURE — 99285 EMERGENCY DEPT VISIT HI MDM: CPT | Mod: 25

## 2018-10-15 PROCEDURE — 80053 COMPREHEN METABOLIC PANEL: CPT

## 2018-10-15 PROCEDURE — 83735 ASSAY OF MAGNESIUM: CPT

## 2018-10-15 PROCEDURE — 83605 ASSAY OF LACTIC ACID: CPT

## 2018-10-15 PROCEDURE — 82803 BLOOD GASES ANY COMBINATION: CPT

## 2018-10-15 PROCEDURE — 82435 ASSAY OF BLOOD CHLORIDE: CPT

## 2018-10-15 PROCEDURE — 94640 AIRWAY INHALATION TREATMENT: CPT

## 2018-10-15 PROCEDURE — 71250 CT THORAX DX C-: CPT

## 2018-10-15 PROCEDURE — 96365 THER/PROPH/DIAG IV INF INIT: CPT

## 2018-10-15 PROCEDURE — 85027 COMPLETE CBC AUTOMATED: CPT

## 2018-10-15 PROCEDURE — 87633 RESP VIRUS 12-25 TARGETS: CPT

## 2018-10-15 PROCEDURE — 85014 HEMATOCRIT: CPT

## 2018-10-15 PROCEDURE — 82330 ASSAY OF CALCIUM: CPT

## 2018-10-15 PROCEDURE — 99239 HOSP IP/OBS DSCHRG MGMT >30: CPT

## 2018-10-15 PROCEDURE — 87040 BLOOD CULTURE FOR BACTERIA: CPT

## 2018-10-15 PROCEDURE — 71046 X-RAY EXAM CHEST 2 VIEWS: CPT

## 2018-10-15 PROCEDURE — 87798 DETECT AGENT NOS DNA AMP: CPT

## 2018-10-15 PROCEDURE — 82947 ASSAY GLUCOSE BLOOD QUANT: CPT

## 2018-10-15 PROCEDURE — 87070 CULTURE OTHR SPECIMN AEROBIC: CPT

## 2018-10-15 PROCEDURE — 96375 TX/PRO/DX INJ NEW DRUG ADDON: CPT

## 2018-10-15 PROCEDURE — 80048 BASIC METABOLIC PNL TOTAL CA: CPT

## 2018-10-15 PROCEDURE — 87486 CHLMYD PNEUM DNA AMP PROBE: CPT

## 2018-10-15 RX ORDER — IPRATROPIUM/ALBUTEROL SULFATE 18-103MCG
3 AEROSOL WITH ADAPTER (GRAM) INHALATION EVERY 6 HOURS
Qty: 0 | Refills: 0 | Status: DISCONTINUED | OUTPATIENT
Start: 2018-10-15 | End: 2018-10-15

## 2018-10-15 RX ORDER — ALBUTEROL 90 UG/1
2 AEROSOL, METERED ORAL
Qty: 0 | Refills: 0 | COMMUNITY

## 2018-10-15 RX ORDER — SIMETHICONE 80 MG/1
80 TABLET, CHEWABLE ORAL ONCE
Qty: 0 | Refills: 0 | Status: COMPLETED | OUTPATIENT
Start: 2018-10-15 | End: 2018-10-15

## 2018-10-15 RX ADMIN — BUDESONIDE AND FORMOTEROL FUMARATE DIHYDRATE 2 PUFF(S): 160; 4.5 AEROSOL RESPIRATORY (INHALATION) at 05:09

## 2018-10-15 RX ADMIN — Medication 3 MILLILITER(S): at 09:51

## 2018-10-15 RX ADMIN — Medication 1200 MILLIGRAM(S): at 05:09

## 2018-10-15 RX ADMIN — Medication 81 MILLIGRAM(S): at 12:03

## 2018-10-15 RX ADMIN — Medication 3 MILLILITER(S): at 05:09

## 2018-10-15 RX ADMIN — Medication 3 MILLILITER(S): at 13:01

## 2018-10-15 RX ADMIN — Medication 650 MILLIGRAM(S): at 12:03

## 2018-10-15 RX ADMIN — SIMETHICONE 80 MILLIGRAM(S): 80 TABLET, CHEWABLE ORAL at 13:01

## 2018-10-15 RX ADMIN — Medication 40 MILLIGRAM(S): at 05:09

## 2018-10-15 NOTE — DISCHARGE NOTE ADULT - HOSPITAL COURSE
62F c hx bronchitis, asthma, obesity, JEFFRY on CPAP, chronic cough and chronic BOLDEN, pw sob, fever, small hemoptysis.    Pt states she has chronic SOB/BOLDEN and cough for 2 years, for which she saw a pulmonologist and a cardiologist, and unclear etiology of the BOLDEN. Pt states she's never had PNA before and never had a CT of her chest. Pt reports 4 days of increasing BOLDEN, cough above baseline, with subjective fevers. Reports taking 5 total doses of augmentin that she self-medicated, as she had antibiotics left over from another time. Reports 1 day of small productive, pink colored sputum. Also reports a few days of intermittent diarrhea. Denies CP, but reports pleuritic back pain.    VS: Tm 98.8, P 93, /73, R 20, 95% RA  in the ED, received azithro, ceftriaxone, Ns 1L, zofran, tessalon perle, hycodan    CT chest - pneumonia  IV Levaquin and steroids.  Followed by pulmonary.    Stable for discharge on antibiotics/steroids and follow up with PMD/Pulmonary and repeat CT in 4 weeks. 62F c hx bronchitis, asthma, obesity, JEFFRY on CPAP, chronic cough and chronic BOLDEN, pw sob, fever, small hemoptysis.    Pt states she has chronic SOB/BOLDEN and cough for 2 years, for which she saw a pulmonologist and a cardiologist, and unclear etiology of the BOLDEN. Pt states she's never had PNA before and never had a CT of her chest. Pt reports 4 days of increasing BOLDEN, cough above baseline, with subjective fevers. Reports taking 5 total doses of augmentin that she self-medicated, as she had antibiotics left over from another time. Reports 1 day of small productive, pink colored sputum. Also reports a few days of intermittent diarrhea. Denies CP, but reports pleuritic back pain.    VS: Tm 98.8, P 93, /73, R 20, 95% RA  in the ED, received azithro, ceftriaxone, Ns 1L, zofran, tessalon perle, hycodan    CT chest - pneumonia  IV Levaquin and steroids.  Followed by pulmonary.    Stable for discharge on oral steroids and follow up with PMD/Pulmonary and repeat CT in 4 weeks. 62F c hx bronchitis, asthma, obesity, JEFFRY on CPAP, chronic cough and chronic BOLDEN, pw sob, fever, small hemoptysis.    Pt states she has chronic SOB/BOLDEN and cough for 2 years, for which she saw a pulmonologist and a cardiologist, and unclear etiology of the BOLDEN. Pt states she's never had PNA before and never had a CT of her chest. Pt reports 4 days of increasing BOLDEN, cough above baseline, with subjective fevers. Reports taking 5 total doses of augmentin that she self-medicated, as she had antibiotics left over from another time. Reports 1 day of small productive, pink colored sputum. Also reports a few days of intermittent diarrhea. Denies CP, but reports pleuritic back pain.    VS: Tm 98.8, P 93, /73, R 20, 95% RA  in the ED, received azithro, ceftriaxone, Ns 1L, zofran, tessalon perle, hycodan    CT chest - pneumonia  IV Levaquin course completed.   IV steroids.  Followed by pulmonary.    Stable for discharge on oral steroids and follow up with PMD/Pulmonary and repeat CT in 4 weeks. The patient is a 62-year-old woman with PMH of bronchitis, asthma, obesity, and JEFFRY on CPAP, and chronic SOB/BOLDEN who initially presented with SOB and fever. sob, fever, small hemoptysis.  In the ED, CT chest showed a LOUIS PNA.    The patient was admitted to the medicine service and followed by pulmonary on consult.  The patient was treated with a course of antibiotics and steroids.  The patient clinically improved and was discharged in stable condition on a short course of oral steroids with plan for outpatient PMD and Pulmonary follow-up and a repeat CT in 4 weeks.

## 2018-10-15 NOTE — DISCHARGE NOTE ADULT - CONDITIONS AT DISCHARGE
Alert and oriented X 4. Skin color good warm and dry tot ouch. Respirations regular and unlabored. Occasional nonproductive cough.  Denies pain or discomfort. Appetite good at meals. Ambulating in room with steady gait. Self showered. Voiding without discomfort. BM X 1. No distress noted at time of discharge.

## 2018-10-15 NOTE — DISCHARGE NOTE ADULT - SECONDARY DIAGNOSIS.
Sepsis, due to unspecified organism Asthma, unspecified asthma severity, unspecified whether complicated, unspecified whether persistent JEFFRY (obstructive sleep apnea)

## 2018-10-15 NOTE — DISCHARGE NOTE ADULT - PATIENT PORTAL LINK FT
You can access the [facility] Patient Portal, offered by [facility], by registering with the following website: [email or web address]

## 2018-10-15 NOTE — PROGRESS NOTE ADULT - PROBLEM SELECTOR PROBLEM 4
Asthma, unspecified asthma severity, unspecified whether complicated, unspecified whether persistent
JEFFRY (obstructive sleep apnea)
Asthma, unspecified asthma severity, unspecified whether complicated, unspecified whether persistent
JEFFRY (obstructive sleep apnea)

## 2018-10-15 NOTE — DIETITIAN INITIAL EVALUATION ADULT. - ENERGY NEEDS
HT 64 inches, .12 pounds, , BMI 39.1,  pounds.  Dxd with Pleuritis, CAP, Sepsis, JEFFRY on Cpap.  Skin intact.  Well nourished, obesity.  Fluids per team.

## 2018-10-15 NOTE — PROGRESS NOTE ADULT - PROBLEM SELECTOR PROBLEM 3
JEFFRY (obstructive sleep apnea)
Sepsis, due to unspecified organism
JEFFRY (obstructive sleep apnea)
Sepsis, due to unspecified organism

## 2018-10-15 NOTE — PROGRESS NOTE ADULT - PROBLEM SELECTOR PLAN 2
paini socntroled
- end expiratory wheezes, currently in exacerbation vs reactive airway dz vs COPD exacerbation  - cont duonebs + inhaled steroids  Start patient on iv Solumedrol iv q8, monitor for response.
- end expiratory wheezes, currently in exacerbation vs reactive airway dz vs COPD exacerbation  - cont duonebs + inhaled steroids  Continue with iv Solumedrol change to oral Prednisone am tomorrow.
- end expiratory wheezes, currently in exacerbation vs reactive airway dz vs COPD exacerbation  - cont duonebs + inhaled steroids  Continue with iv Solumedrol change to oral Prednisone am tomorrow.  Pulm consult called . Follow up recs.
- end expiratory wheezes, currently in exacerbation vs reactive airway dz vs COPD exacerbation  - cont duonebs + inhaled steroids  Continue with iv Solumedrol change to oral Prednisone am tomorrow.  Pulm following
- end expiratory wheezes, currently in exacerbation vs reactive airway dz vs COPD exacerbation  - cont duonebs + inhaled steroids  Continue with iv Solumedrol change to oral Prednisone am tomorrow.  Pulm following
Dispo: home today with pulm outpatient f/u

## 2018-10-15 NOTE — CHART NOTE - NSCHARTNOTEFT_GEN_A_CORE
Request from Dr. Martinez to facilitate patient discharge.  Medication reconciliation reviewed, revised, and resolved with Dr. Martinez, who has medically cleared patient for discharge with followup as advised.

## 2018-10-15 NOTE — DISCHARGE NOTE ADULT - MEDICATION SUMMARY - MEDICATIONS TO TAKE
I will START or STAY ON the medications listed below when I get home from the hospital:    predniSONE 20 mg oral tablet  -- 2 tab(s) by mouth once a day  Take on 10/16/18 and 10/17/18 and then discontinue  -- Indication: For CAP (community acquired pneumonia)    Aspirin Enteric Coated 81 mg oral delayed release tablet  -- 1 tab(s) by mouth once a day  -- Indication: For Need for prophylactic measure    albuterol 90 mcg/inh inhalation aerosol  -- 2 puff(s) inhaled 4 times a day, As Needed - for shortness of breath and/or wheezing  -- Indication: For Asthma, unspecified asthma severity, unspecified whether complicated, unspecified whether persistent I will START or STAY ON the medications listed below when I get home from the hospital:    predniSONE 20 mg oral tablet  -- 2 tab(s) by mouth once a day  Take on 10/16/18 and 10/17/18 and then discontinue  -- Indication: For Multifocal pneumonia    Aspirin Enteric Coated 81 mg oral delayed release tablet  -- 1 tab(s) by mouth once a day  -- Indication: For Need for prophylactic measure    albuterol 90 mcg/inh inhalation aerosol  -- 2 puff(s) inhaled 4 times a day, As Needed - for shortness of breath and/or wheezing  -- Indication: For Asthma, unspecified asthma severity, unspecified whether complicated, unspecified whether persistent

## 2018-10-15 NOTE — DISCHARGE NOTE ADULT - MEDICATION SUMMARY - MEDICATIONS TO STOP TAKING
I will STOP taking the medications listed below when I get home from the hospital:    Augmentin 875 mg-125 mg oral tablet  -- 1 tab(s) by mouth every 12 hours - pt self medicated for 2 days prior to admission (stopped)

## 2018-10-15 NOTE — DIETITIAN INITIAL EVALUATION ADULT. - OTHER INFO
Patient seen for routine LOS assessment.  Patient found sitting in chair, ate 100 % of breakfast.  Concerned that she broke tooth at breakfast this morning. Notes that she had a problem with another tooth recently and concerned why this is happening.  Patient is on steroids and discussed potential side effects of treatment. Suggested Calcium and Vit D supplement as well as watching CHO and sugar intake.  Patient reports weight stable but feels she may have gained recently.  Also notes increased cravings for sugar while on steroids.  PTA, followed a regular diet and denied any problems.  Supplemented with multivitamin, probiotic and biotin.

## 2018-10-15 NOTE — PROGRESS NOTE ADULT - PROBLEM SELECTOR PROBLEM 1
CAP (community acquired pneumonia)

## 2018-10-15 NOTE — PROGRESS NOTE ADULT - PROBLEM SELECTOR PLAN 3
on cpap
Blood cultures no growth to date.
on cpap
Blood cultures no growth to date.
resolved.   Blood cultures no growth to date.

## 2018-10-15 NOTE — PROGRESS NOTE ADULT - PROVIDER SPECIALTY LIST ADULT
Hospitalist
Internal Medicine
Pulmonology
Hospitalist
Pulmonology

## 2018-10-15 NOTE — DISCHARGE NOTE ADULT - NS AS DC FOLLOWUP STROKE INST
Smoking Cessation/Pneumonia/Influenza vaccination (VIS Pub Date: August 7, 2015) Pneumonia/Influenza vaccination (VIS Pub Date: August 7, 2015)

## 2018-10-15 NOTE — PROGRESS NOTE ADULT - PROBLEM SELECTOR PLAN 1
- s/p 5 doses augmentin, 1 dose azithro/ceft  - cont levaquin, start patient on steroids.   - CT chest show LOUIS Pneumonia.   Hemoptysis likely 2/2 PNA. monitor clinically
- CT chest show LOUIS Pneumonia s/p 5 doses augmentin, 1 dose azithro/ceft  - cont levaquin, Solumedrol.   Taper to oral Prednisone am tomorrow.     CT chest show LOUIS Pneumonia.   Pulm consult called . Follow up recs.
- CT chest show LOUIS Pneumonia s/p 5 doses augmentin, 1 dose azithro/ceft  - cont levaquin, continue oral Prednisone   Pulm consult appreciated.   D/C planning am tomorrow.
- CT chest show LOUIS Pneumonia s/p 5 doses augmentin, 1 dose azithro/ceft  - cont levaquin, continue oral Prednisone   Pulm consulted.    D/C planning am tomorrow.
- s/p 5 doses augmentin, 1 dose azithro/ceft  - cont levaquin, start patient on steroids.   - CT chest show LOUIS Pneumonia.   Hemoptysis likely 2/2 PNA. monitor clinically
CT chest show LOUIS Pneumonia s/p 5 doses augmentin outpatient   - continue levaquin and prednisone   - planned for outpatient pulm f/u  - will need repeat CT Chest outpatient
finished course of abx
cont antibiotics: Change to po prednisone for a few days

## 2018-10-15 NOTE — DIETITIAN INITIAL EVALUATION ADULT. - NS AS NUTRI INTERV ED CONTENT
Recommended modifications/Discussed balanced and consistent meal planning, watching CHO intake and increasing lean protein intake.  Discussed Supplementation with Calcium and Vit D/Purpose of the nutrition education

## 2018-10-15 NOTE — PHARMACOTHERAPY INTERVENTION NOTE - COMMENTS
Medication reconciliation completed. Please refer to outpatient medication review for the updated list.     Terrance Billy, PharmD  [phone]
Levofloxacin 750mg IV Q24H was switched to 750mg PO Q24H per IV to PO conversion policy.    Terrance Billy, PharmD  [phone]

## 2018-10-15 NOTE — DISCHARGE NOTE ADULT - PLAN OF CARE
symptoms improved Pneumonia is a lung infection that can cause a fever, cough, and trouble breathing.  Continue all antibiotics as ordered until complete.  Nutrition is important, eat small frequent meals.  Get lots of rest and drink fluids.  Call your health care provider upon arrival home from hospital and make a follow up appointment for one week.  If your cough worsens, you develop fever greater than 101', you have shaking chills, a fast heartbeat, trouble breathing and/or feel your are breathing much faster than usual, call your healthcare provider.  Make sure you wash your hands frequently. Take all antibiotics as ordered.  Call you Health care provider upon arrival home to make a one week follow up appointment.  If you develop fever, chills, malaise, or change in mental status call your Health Care Provider or go to the Emergency Department.  Nutrition is important, eat small frequent meals to help ensure you get adequate calories.  Do not stay in bed all day!  Increase your activity daily as tolerated. Continue with current treatment.  Follow up with your pulmonologist as an outpatient. Continue with CPAP. You have completed your course of antibiotics.  You will need to follow up with your pulmonologist, Dr. Gautam, within one week of discharge - please call to make an appointment.  You will also need a repeat CT chest in 4 weeks to evaluate if pneumonia is resolved.  Pneumonia is a lung infection that can cause a fever, cough, and trouble breathing.  Continue all antibiotics as ordered until complete.  Nutrition is important, eat small frequent meals.  Get lots of rest and drink fluids.  Call your health care provider upon arrival home from hospital and make a follow up appointment for one week.  If your cough worsens, you develop fever greater than 101', you have shaking chills, a fast heartbeat, trouble breathing and/or feel your are breathing much faster than usual, call your healthcare provider.  Make sure you wash your hands frequently. You have completed your course of antibiotics.  Call you Health care provider upon arrival home to make a one week follow up appointment.  If you develop fever, chills, malaise, or change in mental status call your Health Care Provider or go to the Emergency Department.  Nutrition is important, eat small frequent meals to help ensure you get adequate calories.  Do not stay in bed all day!  Increase your activity daily as tolerated. You will take prednisone on 10/16/18 and 10/17/18, and then discontinue.  Continue with current treatment.  Follow up with your pulmonologist as an outpatient.

## 2018-10-15 NOTE — DISCHARGE NOTE ADULT - ADDITIONAL INSTRUCTIONS
You will need to follow up with your pulmonologist, Dr. Gautam, within one week of discharge - please call to make an appointment.  You will also need a repeat CT chest in 4 weeks to evaluate if pneumonia is resolved.  You will need to follow up with your primary medical doctor within one week of discharge - please call to make an appointment.

## 2018-10-15 NOTE — PROGRESS NOTE ADULT - PROBLEM SELECTOR PROBLEM 2
Asthma, unspecified asthma severity, unspecified whether complicated, unspecified whether persistent
Need for prophylactic measure
Pleuritis
Pleuritis

## 2018-10-15 NOTE — PROGRESS NOTE ADULT - ASSESSMENT
61 y/o F with PMH of asthma, JEFFRY on CPAP, chronic cough (x2 years) presents 10/9 with 3 day history of shortness of breath, fever/chills. Found to have dense LOUIS PNA. Finishing course of abx.
62F c hx bronchitis, asthma, obesity, JEFFRY on CPAP presents with sepsis 2/2 LOUIS PNA
62F c hx bronchitis, asthma, obesity, JEFFRY on CPAP, chronic cough and chronic BOLDEN, pw sepsis 2/2 LOUIS PNA
63 y/o F with PMH of asthma, JEFFRY on CPAP, chronic cough (x2 years) presents 10/9 with 3 day history of shortness of breath, fever/chills. Found to have dense LOUIS PNA. She receieved 3 days of Augmentin as outpt, now day 5 of levaquin. Patient continues to have dyspnea with exertion, dry cough with occasional yellow-green phlegm, pain on L side while laying, epigastric pain. Still dyspneic. Called to sandra.

## 2018-10-15 NOTE — PROGRESS NOTE ADULT - REASON FOR ADMISSION
sob, fever, hemoptysis

## 2018-10-15 NOTE — DISCHARGE NOTE ADULT - CARE PLAN
Principal Discharge DX:	Multifocal pneumonia  Goal:	symptoms improved  Assessment and plan of treatment:	Pneumonia is a lung infection that can cause a fever, cough, and trouble breathing.  Continue all antibiotics as ordered until complete.  Nutrition is important, eat small frequent meals.  Get lots of rest and drink fluids.  Call your health care provider upon arrival home from hospital and make a follow up appointment for one week.  If your cough worsens, you develop fever greater than 101', you have shaking chills, a fast heartbeat, trouble breathing and/or feel your are breathing much faster than usual, call your healthcare provider.  Make sure you wash your hands frequently.  Secondary Diagnosis:	Sepsis, due to unspecified organism  Assessment and plan of treatment:	Take all antibiotics as ordered.  Call you Health care provider upon arrival home to make a one week follow up appointment.  If you develop fever, chills, malaise, or change in mental status call your Health Care Provider or go to the Emergency Department.  Nutrition is important, eat small frequent meals to help ensure you get adequate calories.  Do not stay in bed all day!  Increase your activity daily as tolerated.  Secondary Diagnosis:	Asthma, unspecified asthma severity, unspecified whether complicated, unspecified whether persistent  Assessment and plan of treatment:	Continue with current treatment.  Follow up with your pulmonologist as an outpatient.  Secondary Diagnosis:	JEFFRY (obstructive sleep apnea)  Assessment and plan of treatment:	Continue with CPAP. Principal Discharge DX:	Multifocal pneumonia  Goal:	symptoms improved  Assessment and plan of treatment:	You have completed your course of antibiotics.  You will need to follow up with your pulmonologist, Dr. Gautam, within one week of discharge - please call to make an appointment.  You will also need a repeat CT chest in 4 weeks to evaluate if pneumonia is resolved.  Pneumonia is a lung infection that can cause a fever, cough, and trouble breathing.  Continue all antibiotics as ordered until complete.  Nutrition is important, eat small frequent meals.  Get lots of rest and drink fluids.  Call your health care provider upon arrival home from hospital and make a follow up appointment for one week.  If your cough worsens, you develop fever greater than 101', you have shaking chills, a fast heartbeat, trouble breathing and/or feel your are breathing much faster than usual, call your healthcare provider.  Make sure you wash your hands frequently.  Secondary Diagnosis:	Sepsis, due to unspecified organism  Assessment and plan of treatment:	You have completed your course of antibiotics.  Call you Health care provider upon arrival home to make a one week follow up appointment.  If you develop fever, chills, malaise, or change in mental status call your Health Care Provider or go to the Emergency Department.  Nutrition is important, eat small frequent meals to help ensure you get adequate calories.  Do not stay in bed all day!  Increase your activity daily as tolerated.  Secondary Diagnosis:	Asthma, unspecified asthma severity, unspecified whether complicated, unspecified whether persistent  Assessment and plan of treatment:	Continue with current treatment.  Follow up with your pulmonologist as an outpatient.  Secondary Diagnosis:	JEFFRY (obstructive sleep apnea)  Assessment and plan of treatment:	Continue with CPAP. Principal Discharge DX:	Multifocal pneumonia  Goal:	symptoms improved  Assessment and plan of treatment:	You have completed your course of antibiotics.  You will need to follow up with your pulmonologist, Dr. Gautam, within one week of discharge - please call to make an appointment.  You will also need a repeat CT chest in 4 weeks to evaluate if pneumonia is resolved.  Pneumonia is a lung infection that can cause a fever, cough, and trouble breathing.  Continue all antibiotics as ordered until complete.  Nutrition is important, eat small frequent meals.  Get lots of rest and drink fluids.  Call your health care provider upon arrival home from hospital and make a follow up appointment for one week.  If your cough worsens, you develop fever greater than 101', you have shaking chills, a fast heartbeat, trouble breathing and/or feel your are breathing much faster than usual, call your healthcare provider.  Make sure you wash your hands frequently.  Secondary Diagnosis:	Sepsis, due to unspecified organism  Assessment and plan of treatment:	You have completed your course of antibiotics.  Call you Health care provider upon arrival home to make a one week follow up appointment.  If you develop fever, chills, malaise, or change in mental status call your Health Care Provider or go to the Emergency Department.  Nutrition is important, eat small frequent meals to help ensure you get adequate calories.  Do not stay in bed all day!  Increase your activity daily as tolerated.  Secondary Diagnosis:	Asthma, unspecified asthma severity, unspecified whether complicated, unspecified whether persistent  Assessment and plan of treatment:	You will take prednisone on 10/16/18 and 10/17/18, and then discontinue.  Continue with current treatment.  Follow up with your pulmonologist as an outpatient.  Secondary Diagnosis:	JEFFRY (obstructive sleep apnea)  Assessment and plan of treatment:	Continue with CPAP.

## 2018-10-15 NOTE — PROGRESS NOTE ADULT - PROBLEM/PLAN-3
DISPLAY PLAN FREE TEXT
body aches

## 2018-10-15 NOTE — PROGRESS NOTE ADULT - ATTENDING COMMENTS
Patient seen with Dr. Walton.  No SOB on RA.  Reports some pleuritic CP with coughing, but cough overall decreased.  On exam, lungs CTA b/l, no wheezing.  Pulse ox 96% on RA.    CAP in patient with underlying asthma.  Day 7 of antibiotics.  Stable on PO Prednisone.  Mild leukocytosis, suspect may be secondary to steroids.    Appears stable for discharge home.  Await pulmonary input regarding steroid taper (if any) at discharge.    Total time spent 35 minutes.
finish course of steroids  finished abx  follow up with Dr. Guzman

## 2018-10-15 NOTE — PROGRESS NOTE ADULT - SUBJECTIVE AND OBJECTIVE BOX
Follow-up Pulm Progress Note    No new respiratory events overnight.  Denies SOB/CP.     Medications:  MEDICATIONS  (STANDING):  ALBUTerol/ipratropium for Nebulization 3 milliLiter(s) Nebulizer every 4 hours  aspirin enteric coated 81 milliGRAM(s) Oral daily  buDESOnide  80 MICROgram(s)/formoterol 4.5 MICROgram(s) Inhaler 2 Puff(s) Inhalation two times a day  guaiFENesin ER 1200 milliGRAM(s) Oral every 12 hours  levoFLOXacin  Tablet 750 milliGRAM(s) Oral every 24 hours  predniSONE   Tablet 40 milliGRAM(s) Oral daily    MEDICATIONS  (PRN):  acetaminophen   Tablet .. 650 milliGRAM(s) Oral every 6 hours PRN Moderate Pain (4 - 6)  HYDROcodone/homatropine Syrup 5 milliLiter(s) Oral every 6 hours PRN Cough    Vital Signs Last 24 Hrs  T(C): 36.8 (15 Oct 2018 12:02), Max: 36.8 (15 Oct 2018 12:02)  T(F): 98.2 (15 Oct 2018 12:02), Max: 98.2 (15 Oct 2018 12:02)  HR: 67 (15 Oct 2018 12:02) (67 - 87)  BP: 125/79 (15 Oct 2018 12:02) (125/79 - 131/85)  BP(mean): --  RR: 18 (15 Oct 2018 12:02) (18 - 18)  SpO2: 96% (15 Oct 2018 12:02) (95% - 96%)    10-14 @ 07:01  -  10-15 @ 07:00  --------------------------------------------------------  IN: 800 mL / OUT: 0 mL / NET: 800 mL    LABS:                        12.8   13.27 )-----------( 371      ( 15 Oct 2018 07:44 )             41.2     10-15    138  |  103  |  25<H>  ----------------------------<  101<H>  4.1   |  23  |  0.96    Ca    8.7      15 Oct 2018 07:01    CAPILLARY BLOOD GLUCOSE    Procalcitonin, Serum: 0.03 ng/mL (10-13-18 @ 05:47)    CULTURES: (if applicable)    Culture - Blood (collected 10-09-18 @ 17:48)  Source: .Blood Blood-Venous  Final Report (10-14-18 @ 18:01):    No growth at 5 days.    Culture - Blood (collected 10-09-18 @ 17:48)  Source: .Blood Blood-Peripheral  Final Report (10-14-18 @ 18:01):    No growth at 5 days.    Physical Examination:  PULM: Clear to auscultation bilaterally, no significant sputum production  CVS: S1, S2 heard    RADIOLOGY REVIEWED  CXR:     CT chest:    TTE:
Patient is a 62y old  Female who presents with a chief complaint of sob, fever, hemoptysis (09 Oct 2018 06:30)      SUBJECTIVE / OVERNIGHT EVENTS: Patient reports cough, feels much improved than before. No fevers.    No events over night.   ROS other wise negative.      T(C): 36.7 (10-12-18 @ 10:00), Max: 36.8 (10-12-18 @ 04:28)  HR: 84 (10-12-18 @ 10:00) (83 - 84)  BP: 134/83 (10-12-18 @ 10:00) (134/83 - 144/93)  RR: 20 (10-12-18 @ 10:00) (18 - 20)  SpO2: 94% (10-12-18 @ 10:00) (94% - 94%)    MEDICATIONS  (STANDING):  ALBUTerol/ipratropium for Nebulization 3 milliLiter(s) Nebulizer every 4 hours  aspirin enteric coated 81 milliGRAM(s) Oral daily  benzonatate 100 milliGRAM(s) Oral every 8 hours  buDESOnide  80 MICROgram(s)/formoterol 4.5 MICROgram(s) Inhaler 2 Puff(s) Inhalation two times a day  levoFLOXacin IVPB 750 milliGRAM(s) IV Intermittent every 24 hours  methylPREDNISolone sodium succinate Injectable 20 milliGRAM(s) IV Push every 8 hours    MEDICATIONS  (PRN):  acetaminophen   Tablet .. 650 milliGRAM(s) Oral every 6 hours PRN Moderate Pain (4 - 6)  HYDROcodone/homatropine Syrup 5 milliLiter(s) Oral every 6 hours PRN Cough      PHYSICAL EXAM:  GENERAL: NAD, well-developed  HEAD:  Atraumatic, Normocephalic  EYES: EOMI, conjunctiva and sclera clear  NECK: Supple, No JVD  CHEST/LUNG: b/l wheezing with rhonchi   HEART: Regular rate and rhythm; No murmurs, rubs, or gallops  ABDOMEN: Soft, Nontender, Nondistended; Bowel sounds present  EXTREMITIES:  2+ Peripheral Pulses, No clubbing, cyanosis, or edema  PSYCH: AAOx3  NEUROLOGY: non-focal  SKIN: No rashes or lesions                                   RADIOLOGY & ADDITIONAL TESTS:    Imaging Personally Reviewed: CT chest show [facility] pneumonia     Consultant(s) Notes Reviewed:    Care Discussed with Consultants/Other Providers:  Echo EF 56 %, normal LV function
Patient is a 62y old  Female who presents with a chief complaint of sob, fever, hemoptysis (09 Oct 2018 06:30)      SUBJECTIVE / OVERNIGHT EVENTS: Patient reports cough, feels much improved than before. No fevers.    ROS other wise negative.      T(C): 36.4 (10-11-18 @ 13:46), Max: 36.4 (10-11-18 @ 13:46)  HR: 88 (10-11-18 @ 13:46) (88 - 88)  BP: 111/68 (10-11-18 @ 13:46) (111/68 - 111/68)  RR: 18 (10-11-18 @ 13:46) (18 - 18)  SpO2: 94% (10-11-18 @ 13:46) (94% - 94%)    MEDICATIONS  (STANDING):  ALBUTerol/ipratropium for Nebulization 3 milliLiter(s) Nebulizer every 4 hours  aspirin enteric coated 81 milliGRAM(s) Oral daily  benzonatate 100 milliGRAM(s) Oral every 8 hours  buDESOnide  80 MICROgram(s)/formoterol 4.5 MICROgram(s) Inhaler 2 Puff(s) Inhalation two times a day  levoFLOXacin IVPB 750 milliGRAM(s) IV Intermittent every 24 hours  methylPREDNISolone sodium succinate Injectable 20 milliGRAM(s) IV Push every 8 hours    MEDICATIONS  (PRN):  acetaminophen   Tablet .. 650 milliGRAM(s) Oral every 6 hours PRN Moderate Pain (4 - 6)  HYDROcodone/homatropine Syrup 5 milliLiter(s) Oral every 6 hours PRN Cough      PHYSICAL EXAM:  GENERAL: NAD, well-developed  HEAD:  Atraumatic, Normocephalic  EYES: EOMI, conjunctiva and sclera clear  NECK: Supple, No JVD  CHEST/LUNG: b/l wheezing with rhonchi   HEART: Regular rate and rhythm; No murmurs, rubs, or gallops  ABDOMEN: Soft, Nontender, Nondistended; Bowel sounds present  EXTREMITIES:  2+ Peripheral Pulses, No clubbing, cyanosis, or edema  PSYCH: AAOx3  NEUROLOGY: non-focal  SKIN: No rashes or lesions                                   RADIOLOGY & ADDITIONAL TESTS:    Imaging Personally Reviewed: CT chest show [place] pneumonia     Consultant(s) Notes Reviewed:    Care Discussed with Consultants/Other Providers:  Echo EF 56 %, normal LV function
Patient is a 62y old  Female who presents with a chief complaint of sob, fever, hemoptysis (14 Oct 2018 16:49)      SUBJECTIVE / OVERNIGHT EVENTS:  Overnight: no events  Patient feels better today. Ready to go home    MEDICATIONS  (STANDING):  ALBUTerol/ipratropium for Nebulization 3 milliLiter(s) Nebulizer every 4 hours  aspirin enteric coated 81 milliGRAM(s) Oral daily  buDESOnide  80 MICROgram(s)/formoterol 4.5 MICROgram(s) Inhaler 2 Puff(s) Inhalation two times a day  guaiFENesin ER 1200 milliGRAM(s) Oral every 12 hours  levoFLOXacin  Tablet 750 milliGRAM(s) Oral every 24 hours  predniSONE   Tablet 40 milliGRAM(s) Oral daily    MEDICATIONS  (PRN):  acetaminophen   Tablet .. 650 milliGRAM(s) Oral every 6 hours PRN Moderate Pain (4 - 6)  HYDROcodone/homatropine Syrup 5 milliLiter(s) Oral every 6 hours PRN Cough      T(C): 36.7 (10-15-18 @ 04:44), Max: 37.3 (10-14-18 @ 12:48)  HR: 70 (10-15-18 @ 04:44) (70 - 94)  BP: 126/81 (10-15-18 @ 04:44) (126/81 - 149/81)  RR: 18 (10-15-18 @ 04:44) (18 - 18)  SpO2: 96% (10-15-18 @ 04:44) (94% - 96%)  CAPILLARY BLOOD GLUCOSE        I&O's Summary    14 Oct 2018 07:01  -  15 Oct 2018 07:00  --------------------------------------------------------  IN: 800 mL / OUT: 0 mL / NET: 800 mL        PHYSICAL EXAM:  GENERAL: NAD, well-developed  HEAD:  Atraumatic, Normocephalic  CHEST/LUNG: Clear to auscultation bilaterally; No wheeze  HEART: Regular rate and rhythm; No murmurs, rubs, or gallops  ABDOMEN: Soft, Nontender, Nondistended; Bowel sounds present  EXTREMITIES:  2+ Peripheral Pulses, No clubbing, cyanosis, or edema  PSYCH: AAOx3  SKIN: No rashes or lesions    LABS:                        12.8   13.27 )-----------( 371      ( 15 Oct 2018 07:44 )             41.2     10-15    138  |  103  |  25<H>  ----------------------------<  101<H>  4.1   |  23  |  0.96    Ca    8.7      15 Oct 2018 07:01                RADIOLOGY & ADDITIONAL TESTS:    Imaging Personally Reviewed: CT Chest reviewed: PNA seen     Consultant(s) Notes Reviewed:  Pulmonary note    Care Discussed with Consultants/Other Providers: NA
chief complaint of sob, fever, hemoptysis (09 Oct 2018 06:30)      SUBJECTIVE / OVERNIGHT EVENTS: Patient reports cough, feels much improved than before. No fevers.    No events over night.   ROS other wise negative.      T(C): 36.6 (10-13-18 @ 18:56), Max: 36.8 (10-13-18 @ 11:43)  HR: 88 (10-13-18 @ 18:56) (88 - 99)  BP: 154/77 (10-13-18 @ 18:56) (144/79 - 154/77)  RR: 18 (10-13-18 @ 18:56) (18 - 18)  SpO2: 97% (10-13-18 @ 18:56) (95% - 97%)    MEDICATIONS  (STANDING):  ALBUTerol/ipratropium for Nebulization 3 milliLiter(s) Nebulizer every 4 hours  aspirin enteric coated 81 milliGRAM(s) Oral daily  buDESOnide  80 MICROgram(s)/formoterol 4.5 MICROgram(s) Inhaler 2 Puff(s) Inhalation two times a day  guaiFENesin ER 1200 milliGRAM(s) Oral every 12 hours  levoFLOXacin IVPB 750 milliGRAM(s) IV Intermittent every 24 hours  predniSONE   Tablet 40 milliGRAM(s) Oral daily    MEDICATIONS  (PRN):  acetaminophen   Tablet .. 650 milliGRAM(s) Oral every 6 hours PRN Moderate Pain (4 - 6)  HYDROcodone/homatropine Syrup 5 milliLiter(s) Oral every 6 hours PRN Cough      PHYSICAL EXAM:  GENERAL: NAD, well-developed  HEAD:  Atraumatic, Normocephalic  EYES: EOMI, conjunctiva and sclera clear  NECK: Supple, No JVD  CHEST/LUNG: b/l wheezing with rhonchi   HEART: Regular rate and rhythm; No murmurs, rubs, or gallops  ABDOMEN: Soft, Nontender, Nondistended; Bowel sounds present  EXTREMITIES:  2+ Peripheral Pulses, No clubbing, cyanosis, or edema  PSYCH: AAOx3  NEUROLOGY: non-focal  SKIN: No rashes or lesions                            12.3   16.2  )-----------( 349      ( 12 Oct 2018 06:06 )             37.2                                            RADIOLOGY & ADDITIONAL TESTS:    Imaging Personally Reviewed: CT chest show [place] pneumonia     Consultant(s) Notes Reviewed:    Care Discussed with Consultants/Other Providers:  Echo EF 56 %, normal LV function
chief complaint of sob, fever, hemoptysis (09 Oct 2018 06:30)      SUBJECTIVE / OVERNIGHT EVENTS: Patient reports cough, feels much improved than before. No fevers.    No events over night.   ROS other wise negative.      T(C): 36.7 (10-14-18 @ 21:33), Max: 37.3 (10-14-18 @ 12:48)  HR: 87 (10-14-18 @ 21:33) (87 - 94)  BP: 131/85 (10-14-18 @ 21:33) (131/85 - 149/81)  RR: 18 (10-14-18 @ 21:33) (18 - 18)  SpO2: 95% (10-14-18 @ 21:33) (94% - 95%)    MEDICATIONS  (STANDING):  ALBUTerol/ipratropium for Nebulization 3 milliLiter(s) Nebulizer every 4 hours  aspirin enteric coated 81 milliGRAM(s) Oral daily  buDESOnide  80 MICROgram(s)/formoterol 4.5 MICROgram(s) Inhaler 2 Puff(s) Inhalation two times a day  guaiFENesin ER 1200 milliGRAM(s) Oral every 12 hours  levoFLOXacin IVPB 750 milliGRAM(s) IV Intermittent every 24 hours  predniSONE   Tablet 40 milliGRAM(s) Oral daily    MEDICATIONS  (PRN):  acetaminophen   Tablet .. 650 milliGRAM(s) Oral every 6 hours PRN Moderate Pain (4 - 6)  HYDROcodone/homatropine Syrup 5 milliLiter(s) Oral every 6 hours PRN Cough    PHYSICAL EXAM:  GENERAL: NAD, well-developed  HEAD:  Atraumatic, Normocephalic  EYES: EOMI, conjunctiva and sclera clear  NECK: Supple, No JVD  CHEST/LUNG: b/l wheezing with rhonchi   HEART: Regular rate and rhythm; No murmurs, rubs, or gallops  ABDOMEN: Soft, Nontender, Nondistended; Bowel sounds present  EXTREMITIES:  2+ Peripheral Pulses, No clubbing, cyanosis, or edema  PSYCH: AAOx3  NEUROLOGY: non-focal  SKIN: No rashes or lesions                            no new labs                                  RADIOLOGY & ADDITIONAL TESTS:    Imaging Personally Reviewed: CT chest show [place] pneumonia     Consultant(s) Notes Reviewed:    Care Discussed with Consultants/Other Providers:  Echo EF 56 %, normal LV function
Patient is a 62y old  Female who presents with a chief complaint of sob, fever, hemoptysis (09 Oct 2018 06:30)      SUBJECTIVE / OVERNIGHT EVENTS: Patient reports cough, shortness of breath. No fevers.    ROS other wise negative.      T(C): 36.8 (10-10-18 @ 15:00), Max: 36.8 (10-10-18 @ 15:00)  HR: 79 (10-10-18 @ 15:00) (78 - 79)  BP: 140/80 (10-10-18 @ 15:00) (140/80 - 140/80)  RR: 18 (10-10-18 @ 15:00) (18 - 18)  SpO2: 93% (10-10-18 @ 15:00) (93% - 93%)    MEDICATIONS  (STANDING):  ALBUTerol/ipratropium for Nebulization 3 milliLiter(s) Nebulizer every 4 hours  aspirin enteric coated 81 milliGRAM(s) Oral daily  benzonatate 100 milliGRAM(s) Oral every 8 hours  buDESOnide  80 MICROgram(s)/formoterol 4.5 MICROgram(s) Inhaler 2 Puff(s) Inhalation two times a day  levoFLOXacin IVPB 750 milliGRAM(s) IV Intermittent every 24 hours  methylPREDNISolone sodium succinate Injectable 20 milliGRAM(s) IV Push every 8 hours    MEDICATIONS  (PRN):  acetaminophen   Tablet .. 650 milliGRAM(s) Oral every 6 hours PRN Moderate Pain (4 - 6)  HYDROcodone/homatropine Syrup 5 milliLiter(s) Oral every 6 hours PRN Cough      CAPILLARY BLOOD GLUCOSE        I&O's Summary    09 Oct 2018 07:01  -  10 Oct 2018 07:00  --------------------------------------------------------  IN: 150 mL / OUT: 0 mL / NET: 150 mL    10 Oct 2018 07:01  -  10 Oct 2018 20:49  --------------------------------------------------------  IN: 480 mL / OUT: 0 mL / NET: 480 mL      PHYSICAL EXAM:  GENERAL: NAD, well-developed  HEAD:  Atraumatic, Normocephalic  EYES: EOMI, conjunctiva and sclera clear  NECK: Supple, No JVD  CHEST/LUNG: b/l wheezing with rhonchi   HEART: Regular rate and rhythm; No murmurs, rubs, or gallops  ABDOMEN: Soft, Nontender, Nondistended; Bowel sounds present  EXTREMITIES:  2+ Peripheral Pulses, No clubbing, cyanosis, or edema  PSYCH: AAOx3  NEUROLOGY: non-focal  SKIN: No rashes or lesions                          12.9   8.0   )-----------( 283      ( 08 Oct 2018 22:26 )             38.8           LIVER FUNCTIONS - ( 08 Oct 2018 22:26 )  Alb: 3.8 g/dL / Pro: 8.3 g/dL / ALK PHOS: 76 U/L / ALT: 27 U/L / AST: 23 U/L / GGT: x             RADIOLOGY & ADDITIONAL TESTS:    Imaging Personally Reviewed: CT chest show [place] pneumonia     Consultant(s) Notes Reviewed:    Care Discussed with Consultants/Other Providers:  Echo EF 56 %, normal LV function
Patient is a 62y old  Female who presents with a chief complaint of sob, fever, hemoptysis (12 Oct 2018 14:38)      Any change in ROS: Cough +: mild SOB : no wheezing    MEDICATIONS  (STANDING):  ALBUTerol/ipratropium for Nebulization 3 milliLiter(s) Nebulizer every 4 hours  aspirin enteric coated 81 milliGRAM(s) Oral daily  buDESOnide  80 MICROgram(s)/formoterol 4.5 MICROgram(s) Inhaler 2 Puff(s) Inhalation two times a day  guaiFENesin ER 1200 milliGRAM(s) Oral every 12 hours  levoFLOXacin IVPB 750 milliGRAM(s) IV Intermittent every 24 hours  methylPREDNISolone sodium succinate Injectable 20 milliGRAM(s) IV Push every 8 hours    MEDICATIONS  (PRN):  acetaminophen   Tablet .. 650 milliGRAM(s) Oral every 6 hours PRN Moderate Pain (4 - 6)  HYDROcodone/homatropine Syrup 5 milliLiter(s) Oral every 6 hours PRN Cough    Vital Signs Last 24 Hrs  T(C): 36.3 (13 Oct 2018 04:40), Max: 36.8 (12 Oct 2018 14:44)  T(F): 97.4 (13 Oct 2018 04:40), Max: 98.2 (12 Oct 2018 14:44)  HR: 74 (13 Oct 2018 04:40) (74 - 82)  BP: 147/56 (13 Oct 2018 04:40) (129/78 - 160/87)  BP(mean): --  RR: 18 (13 Oct 2018 04:40) (18 - 19)  SpO2: 94% (13 Oct 2018 04:40) (94% - 95%)    I&O's Summary    13 Oct 2018 07:01  -  13 Oct 2018 11:32  --------------------------------------------------------  IN: 300 mL / OUT: 0 mL / NET: 300 mL          Physical Exam:   GENERAL: NAD, well-groomed, well-developed  HEENT: SABINE/   Atraumatic, Normocephalic  ENMT: No tonsillar erythema, exudates, or enlargement; Moist mucous membranes, Good dentition, No lesions  NECK: Supple, No JVD, Normal thyroid  CHEST/LUNG: Clear to auscultaion, ; No rales, rhonchi, wheezing, or rubs  CVS: Regular rate and rhythm; No murmurs, rubs, or gallops  GI: : Soft, Nontender, Nondistended; Bowel sounds present  NERVOUS SYSTEM:  Alert & Oriented X3  EXTREMITIES:  2+ Peripheral Pulses, No clubbing, cyanosis, or edema  LYMPH: No lymphadenopathy noted  SKIN: No rashes or lesions  ENDOCRINOLOGY: No Thyromegaly  PSYCH: Appropriate    Labs:  27                            12.3   16.2  )-----------( 349      ( 12 Oct 2018 06:06 )             37.2                         12.6   17.2  )-----------( 350      ( 11 Oct 2018 20:19 )             38.0     10-12    136  |  102  |  24<H>  ----------------------------<  135<H>  4.8   |  24  |  0.95  10-11    140  |  102  |  19  ----------------------------<  119<H>  4.3   |  25  |  1.20    Ca    9.4      12 Oct 2018 06:06  Ca    9.9      11 Oct 2018 20:19  Mg     2.2     10-12      CAPILLARY BLOOD GLUCOSE                Procalcitonin, Serum: 0.03 ng/mL (10-13 @ 05:47)        RECENT CULTURES:  10-09 @ 21:57 .Sputum Sputum       Few polymorphonuclear leukocytes per low power field  Moderate Squamous epithelial cells per low power field  Numerous Gram positive cocci in pairs seen per oil power field  Numerous Gram Negative Rods seen per oil power field           Normal Respiratory Michelle present    10-09 @ 17:48 .Blood Blood-Peripheral       < from: Xray Chest 1 View- PORTABLE-Urgent (10.12.18 @ 14:59) >    EXAM:  XR CHEST PORTABLE URGENT 1V                            PROCEDURE DATE:  10/12/2018            INTERPRETATION:  CLINICAL INFORMATION: Congestion    Time of Exam:  October 12, 2018 at 2:58 PM    EXAM:  Frontal Chest    FINDINGS:  Both lungs are equally aerated and free of any focal abnormalities. The   heart is not enlarged and there is no effusion.        COMPARISON:  October 8, 2018 with resolution of the left upper lobe   consolidation from that time.        IMPRESSION:  No acute pulmonary disease.    < end of copied text >           No growth to date.          RESPIRATORY CULTURES:          Studies  Chest X-RAY  CT SCAN Chest   Venous Dopplers: LE:   CT Abdomen  Others

## 2018-12-22 NOTE — ED PROVIDER NOTE - OBJECTIVE STATEMENT
62y female with no pertinent PMH presents to the ED for cough and SOB and malaise x 4 days. Pt diagnosed with left lower pneumonia at outpatient urgent care today. Pt was self medicating with augmentin 875 BID x 4 days (medication she had left over at home) without improvement. Denies recent travel, sick contacts, hemoptysis, n/v, urinary symptoms. + RUQ pain since the coughing began Friday. 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 Ex-smoker quit decades ago.     Allergies: NKDA  PMH: No pertinent  Surgical Hx: No pertinent
100

## 2019-04-13 ENCOUNTER — INPATIENT (INPATIENT)
Facility: HOSPITAL | Age: 64
LOS: 3 days | Discharge: HOME | DRG: 392 | End: 2019-04-17
Attending: SURGERY | Admitting: SURGERY
Payer: COMMERCIAL

## 2019-04-13 VITALS
RESPIRATION RATE: 18 BRPM | WEIGHT: 227.08 LBS | TEMPERATURE: 99 F | HEART RATE: 98 BPM | OXYGEN SATURATION: 95 % | SYSTOLIC BLOOD PRESSURE: 124 MMHG | DIASTOLIC BLOOD PRESSURE: 83 MMHG | HEIGHT: 63 IN

## 2019-04-13 DIAGNOSIS — K57.80 DIVERTICULITIS OF INTESTINE, PART UNSPECIFIED, WITH PERFORATION AND ABSCESS WITHOUT BLEEDING: ICD-10-CM

## 2019-04-13 PROBLEM — J45.909 UNSPECIFIED ASTHMA, UNCOMPLICATED: Chronic | Status: ACTIVE | Noted: 2018-10-09

## 2019-04-13 PROBLEM — J40 BRONCHITIS, NOT SPECIFIED AS ACUTE OR CHRONIC: Chronic | Status: ACTIVE | Noted: 2018-10-09

## 2019-04-13 PROBLEM — G47.33 OBSTRUCTIVE SLEEP APNEA (ADULT) (PEDIATRIC): Chronic | Status: ACTIVE | Noted: 2018-10-09

## 2019-04-13 LAB
ALBUMIN SERPL ELPH-MCNC: 3.6 G/DL — SIGNIFICANT CHANGE UP (ref 3.3–5)
ALP SERPL-CCNC: 69 U/L — SIGNIFICANT CHANGE UP (ref 40–120)
ALT FLD-CCNC: 18 U/L — SIGNIFICANT CHANGE UP (ref 10–45)
ANION GAP SERPL CALC-SCNC: 15 MMOL/L — SIGNIFICANT CHANGE UP (ref 5–17)
APPEARANCE UR: ABNORMAL
AST SERPL-CCNC: 20 U/L — SIGNIFICANT CHANGE UP (ref 10–40)
BACTERIA # UR AUTO: NEGATIVE — SIGNIFICANT CHANGE UP
BASE EXCESS BLDV CALC-SCNC: 6 MMOL/L — HIGH (ref -2–2)
BASOPHILS # BLD AUTO: 0 K/UL — SIGNIFICANT CHANGE UP (ref 0–0.2)
BASOPHILS NFR BLD AUTO: 0.1 % — SIGNIFICANT CHANGE UP (ref 0–2)
BILIRUB SERPL-MCNC: 1.5 MG/DL — HIGH (ref 0.2–1.2)
BILIRUB UR-MCNC: NEGATIVE — SIGNIFICANT CHANGE UP
BUN SERPL-MCNC: 13 MG/DL — SIGNIFICANT CHANGE UP (ref 7–23)
CA-I SERPL-SCNC: 1.15 MMOL/L — SIGNIFICANT CHANGE UP (ref 1.12–1.3)
CALCIUM SERPL-MCNC: 9.6 MG/DL — SIGNIFICANT CHANGE UP (ref 8.4–10.5)
CHLORIDE BLDV-SCNC: 106 MMOL/L — SIGNIFICANT CHANGE UP (ref 96–108)
CHLORIDE SERPL-SCNC: 101 MMOL/L — SIGNIFICANT CHANGE UP (ref 96–108)
CO2 BLDV-SCNC: 32 MMOL/L — HIGH (ref 22–30)
CO2 SERPL-SCNC: 24 MMOL/L — SIGNIFICANT CHANGE UP (ref 22–31)
COLOR SPEC: YELLOW — SIGNIFICANT CHANGE UP
CREAT SERPL-MCNC: 0.8 MG/DL — SIGNIFICANT CHANGE UP (ref 0.5–1.3)
DIFF PNL FLD: NEGATIVE — SIGNIFICANT CHANGE UP
EOSINOPHIL # BLD AUTO: 0.1 K/UL — SIGNIFICANT CHANGE UP (ref 0–0.5)
EOSINOPHIL NFR BLD AUTO: 0.4 % — SIGNIFICANT CHANGE UP (ref 0–6)
EPI CELLS # UR: 4 /HPF — SIGNIFICANT CHANGE UP
GAS PNL BLDV: 132 MMOL/L — LOW (ref 136–145)
GAS PNL BLDV: SIGNIFICANT CHANGE UP
GAS PNL BLDV: SIGNIFICANT CHANGE UP
GLUCOSE BLDV-MCNC: 107 MG/DL — HIGH (ref 70–99)
GLUCOSE SERPL-MCNC: 111 MG/DL — HIGH (ref 70–99)
GLUCOSE UR QL: NEGATIVE — SIGNIFICANT CHANGE UP
HCO3 BLDV-SCNC: 31 MMOL/L — HIGH (ref 21–29)
HCT VFR BLD CALC: 40.6 % — SIGNIFICANT CHANGE UP (ref 34.5–45)
HCT VFR BLDA CALC: 42 % — SIGNIFICANT CHANGE UP (ref 39–50)
HGB BLD CALC-MCNC: 13.5 G/DL — SIGNIFICANT CHANGE UP (ref 11.5–15.5)
HGB BLD-MCNC: 13.7 G/DL — SIGNIFICANT CHANGE UP (ref 11.5–15.5)
HYALINE CASTS # UR AUTO: 2 /LPF — SIGNIFICANT CHANGE UP (ref 0–2)
KETONES UR-MCNC: NEGATIVE — SIGNIFICANT CHANGE UP
LACTATE BLDV-MCNC: 1.7 MMOL/L — SIGNIFICANT CHANGE UP (ref 0.7–2)
LEUKOCYTE ESTERASE UR-ACNC: ABNORMAL
LYMPHOCYTES # BLD AUTO: 1.5 K/UL — SIGNIFICANT CHANGE UP (ref 1–3.3)
LYMPHOCYTES # BLD AUTO: 10.8 % — LOW (ref 13–44)
MCHC RBC-ENTMCNC: 28.3 PG — SIGNIFICANT CHANGE UP (ref 27–34)
MCHC RBC-ENTMCNC: 33.8 GM/DL — SIGNIFICANT CHANGE UP (ref 32–36)
MCV RBC AUTO: 83.9 FL — SIGNIFICANT CHANGE UP (ref 80–100)
MONOCYTES # BLD AUTO: 1 K/UL — HIGH (ref 0–0.9)
MONOCYTES NFR BLD AUTO: 7.2 % — SIGNIFICANT CHANGE UP (ref 2–14)
NEUTROPHILS # BLD AUTO: 11.1 K/UL — HIGH (ref 1.8–7.4)
NEUTROPHILS NFR BLD AUTO: 81.5 % — HIGH (ref 43–77)
NITRITE UR-MCNC: NEGATIVE — SIGNIFICANT CHANGE UP
PCO2 BLDV: 46 MMHG — SIGNIFICANT CHANGE UP (ref 35–50)
PH BLDV: 7.44 — SIGNIFICANT CHANGE UP (ref 7.35–7.45)
PH UR: 8 — SIGNIFICANT CHANGE UP (ref 5–8)
PLATELET # BLD AUTO: 258 K/UL — SIGNIFICANT CHANGE UP (ref 150–400)
PO2 BLDV: 38 MMHG — SIGNIFICANT CHANGE UP (ref 25–45)
POTASSIUM BLDV-SCNC: 4.8 MMOL/L — SIGNIFICANT CHANGE UP (ref 3.5–5.3)
POTASSIUM SERPL-MCNC: 4 MMOL/L — SIGNIFICANT CHANGE UP (ref 3.5–5.3)
POTASSIUM SERPL-SCNC: 4 MMOL/L — SIGNIFICANT CHANGE UP (ref 3.5–5.3)
PROT SERPL-MCNC: 7.7 G/DL — SIGNIFICANT CHANGE UP (ref 6–8.3)
PROT UR-MCNC: ABNORMAL
RBC # BLD: 4.84 M/UL — SIGNIFICANT CHANGE UP (ref 3.8–5.2)
RBC # FLD: 14 % — SIGNIFICANT CHANGE UP (ref 10.3–14.5)
RBC CASTS # UR COMP ASSIST: 5 /HPF — HIGH (ref 0–4)
SAO2 % BLDV: 70 % — SIGNIFICANT CHANGE UP (ref 67–88)
SODIUM SERPL-SCNC: 140 MMOL/L — SIGNIFICANT CHANGE UP (ref 135–145)
SP GR SPEC: 1.03 — HIGH (ref 1.01–1.02)
UROBILINOGEN FLD QL: ABNORMAL
WBC # BLD: 13.7 K/UL — HIGH (ref 3.8–10.5)
WBC # FLD AUTO: 13.7 K/UL — HIGH (ref 3.8–10.5)
WBC UR QL: 7 /HPF — HIGH (ref 0–5)

## 2019-04-13 PROCEDURE — 99285 EMERGENCY DEPT VISIT HI MDM: CPT

## 2019-04-13 PROCEDURE — 74177 CT ABD & PELVIS W/CONTRAST: CPT | Mod: 26

## 2019-04-13 PROCEDURE — 99222 1ST HOSP IP/OBS MODERATE 55: CPT

## 2019-04-13 RX ORDER — PIPERACILLIN AND TAZOBACTAM 4; .5 G/20ML; G/20ML
3.38 INJECTION, POWDER, LYOPHILIZED, FOR SOLUTION INTRAVENOUS ONCE
Qty: 0 | Refills: 0 | Status: COMPLETED | OUTPATIENT
Start: 2019-04-13 | End: 2019-04-13

## 2019-04-13 RX ORDER — SODIUM CHLORIDE 9 MG/ML
1000 INJECTION, SOLUTION INTRAVENOUS
Qty: 0 | Refills: 0 | Status: DISCONTINUED | OUTPATIENT
Start: 2019-04-13 | End: 2019-04-14

## 2019-04-13 RX ORDER — KETOROLAC TROMETHAMINE 30 MG/ML
15 SYRINGE (ML) INJECTION ONCE
Qty: 0 | Refills: 0 | Status: DISCONTINUED | OUTPATIENT
Start: 2019-04-13 | End: 2019-04-13

## 2019-04-13 RX ORDER — ONDANSETRON 8 MG/1
4 TABLET, FILM COATED ORAL EVERY 6 HOURS
Qty: 0 | Refills: 0 | Status: DISCONTINUED | OUTPATIENT
Start: 2019-04-13 | End: 2019-04-17

## 2019-04-13 RX ORDER — ACETAMINOPHEN 500 MG
1000 TABLET ORAL ONCE
Qty: 0 | Refills: 0 | Status: COMPLETED | OUTPATIENT
Start: 2019-04-13 | End: 2019-04-13

## 2019-04-13 RX ORDER — MORPHINE SULFATE 50 MG/1
4 CAPSULE, EXTENDED RELEASE ORAL ONCE
Qty: 0 | Refills: 0 | Status: DISCONTINUED | OUTPATIENT
Start: 2019-04-13 | End: 2019-04-13

## 2019-04-13 RX ORDER — PIPERACILLIN AND TAZOBACTAM 4; .5 G/20ML; G/20ML
3.38 INJECTION, POWDER, LYOPHILIZED, FOR SOLUTION INTRAVENOUS EVERY 8 HOURS
Qty: 0 | Refills: 0 | Status: DISCONTINUED | OUTPATIENT
Start: 2019-04-13 | End: 2019-04-17

## 2019-04-13 RX ORDER — SODIUM CHLORIDE 9 MG/ML
1000 INJECTION INTRAMUSCULAR; INTRAVENOUS; SUBCUTANEOUS ONCE
Qty: 0 | Refills: 0 | Status: COMPLETED | OUTPATIENT
Start: 2019-04-13 | End: 2019-04-13

## 2019-04-13 RX ORDER — SODIUM CHLORIDE 9 MG/ML
3 INJECTION INTRAMUSCULAR; INTRAVENOUS; SUBCUTANEOUS ONCE
Qty: 0 | Refills: 0 | Status: COMPLETED | OUTPATIENT
Start: 2019-04-13 | End: 2019-04-13

## 2019-04-13 RX ORDER — ENOXAPARIN SODIUM 100 MG/ML
40 INJECTION SUBCUTANEOUS DAILY
Qty: 0 | Refills: 0 | Status: DISCONTINUED | OUTPATIENT
Start: 2019-04-13 | End: 2019-04-17

## 2019-04-13 RX ORDER — ONDANSETRON 8 MG/1
4 TABLET, FILM COATED ORAL ONCE
Qty: 0 | Refills: 0 | Status: COMPLETED | OUTPATIENT
Start: 2019-04-13 | End: 2019-04-13

## 2019-04-13 RX ADMIN — SODIUM CHLORIDE 3 MILLILITER(S): 9 INJECTION INTRAMUSCULAR; INTRAVENOUS; SUBCUTANEOUS at 13:47

## 2019-04-13 RX ADMIN — Medication 400 MILLIGRAM(S): at 22:05

## 2019-04-13 RX ADMIN — PIPERACILLIN AND TAZOBACTAM 200 GRAM(S): 4; .5 INJECTION, POWDER, LYOPHILIZED, FOR SOLUTION INTRAVENOUS at 19:13

## 2019-04-13 RX ADMIN — Medication 1000 MILLIGRAM(S): at 22:35

## 2019-04-13 RX ADMIN — ONDANSETRON 4 MILLIGRAM(S): 8 TABLET, FILM COATED ORAL at 13:33

## 2019-04-13 RX ADMIN — Medication 15 MILLIGRAM(S): at 15:10

## 2019-04-13 RX ADMIN — SODIUM CHLORIDE 1000 MILLILITER(S): 9 INJECTION INTRAMUSCULAR; INTRAVENOUS; SUBCUTANEOUS at 13:33

## 2019-04-13 RX ADMIN — SODIUM CHLORIDE 1000 MILLILITER(S): 9 INJECTION INTRAMUSCULAR; INTRAVENOUS; SUBCUTANEOUS at 19:59

## 2019-04-13 RX ADMIN — Medication 15 MILLIGRAM(S): at 14:39

## 2019-04-13 RX ADMIN — SODIUM CHLORIDE 125 MILLILITER(S): 9 INJECTION, SOLUTION INTRAVENOUS at 22:05

## 2019-04-13 NOTE — ED ADULT NURSE NOTE - NSIMPLEMENTINTERV_GEN_ALL_ED
Implemented All Universal Safety Interventions:  [place] to call system. Call bell, personal items and telephone within reach. Instruct patient to call for assistance. Room bathroom lighting operational. Non-slip footwear when patient is off stretcher. Physically safe environment: no spills, clutter or unnecessary equipment. Stretcher in lowest position, wheels locked, appropriate side rails in place.

## 2019-04-13 NOTE — ED ADULT NURSE NOTE - OBJECTIVE STATEMENT
63 year old female patient presents ambulatory to ED c/o lower abd pain x 2 days, worse on LLQ. Patient states her last BM was yesterday and was a "small amount, with loose stool" Patient reporting some associated nausea and cramping. Denies CP, SOB, vomiting, diarrhea, fever, chills, urinary symptoms. Patient states she took an tiffayn-seltzer and a "leftover antibiotic" with little relief of pain, denies taking pain medication prior to arrival. Patient reporting 8/10 abd pain. Patient aware of plan of care for evaluation. Awaiting MD evaluation. Family at bedside. VSS.

## 2019-04-13 NOTE — H&P ADULT - ATTENDING COMMENTS
Patient seen and examined  signs / symptoms / imaging consistent with acute diverticulitis  No indications for acute surgical management  Will admit, IVF, IV antibiotics, serial exams & labs  Plan of care discussed with patient who agrees

## 2019-04-13 NOTE — PATIENT PROFILE ADULT - BRADEN MOISTURE
Patient had an uncomplicated  followed by an uncomplicated postpartum course. , Hct as below. On postpartum day 2, patient was discharged home in stable condition, voiding spontaneously and with normal vital signs.               12.0   12.72 )-----------( 247      ( 04-08 @ 14:52 )             36.2 (4) rarely moist

## 2019-04-13 NOTE — ED ADULT NURSE NOTE - FINAL NURSING ELECTRONIC SIGNATURE
Cardiovascular Nurse Navigator ([phone]) Note:    Reviewed ACS/PCI medications:  Dual Antiplatelet Therapy (DAPT):  aspirin + prasugrel  Beta-Blocker:  metoprolol  Statin:  atorvastatin  ACEI/ARB:  N/A  Aldosterone blocking agent:   N/A    Intensive Cardiac Rehab (ICR) Referral:  Referred on 6/28/18; has current inpatient orders for nutrition consult & PT for Phase I ICR  ICR follow-up and contact info added to AVS:  Yes    Cardiology Follow-Up:  July 18th, 2018    Inpatient & Discharge Patient Education:  Bedside nursing to continually provide patient education on ACS meds, signs and symptoms to monitor for, and risk factor modification. Also at discharge please complete the “ACS” special instructions on the AVS.  Thank you and please call with questions.   13-Apr-2019 20:04

## 2019-04-13 NOTE — ED ADULT NURSE REASSESSMENT NOTE - NS ED NURSE REASSESS COMMENT FT1
Patient declining morphine at this time, wants something else for pain. Per MD, will wait for kidney function then order toradol. Patient aware of plan of care.

## 2019-04-13 NOTE — H&P ADULT - NSICDXPASTMEDICALHX_GEN_ALL_CORE_FT
PAST MEDICAL HISTORY:  Asthma, unspecified asthma severity, unspecified whether complicated, unspecified whether persistent     Bronchitis     JEFFRY (obstructive sleep apnea)

## 2019-04-13 NOTE — H&P ADULT - NSHPLABSRESULTS_GEN_ALL_CORE
Vital Signs Last 24 Hrs  T(C): 36.7 (2019 21:00), Max: 37.9 (2019 19:36)  T(F): 98.1 (2019 21:00), Max: 100.2 (2019 19:36)  HR: 93 (2019 21:00) (84 - 98)  BP: 139/82 (2019 21:00) (117/77 - 139/82)  BP(mean): 91 (2019 19:36) (91 - 91)  RR: 18 (2019 21:00) (16 - 18)  SpO2: 93% (2019 21:00) (93% - 99%)      LABS:                        13.7   13.7  )-----------( 258      ( 2019 13:25 )             40.6     04-13    140  |  101  |  13  ----------------------------<  111<H>  4.0   |  24  |  0.80    Ca    9.6      2019 13:25    TPro  7.7  /  Alb  3.6  /  TBili  1.5<H>  /  DBili  x   /  AST  20  /  ALT  18  /  AlkPhos  69  04-13      Urinalysis Basic - ( 2019 15:39 )    Color: Yellow / Appearance: Slightly Turbid / S.027 / pH: x  Gluc: x / Ketone: Negative  / Bili: Negative / Urobili: 8 mg/dL   Blood: x / Protein: 30 mg/dL / Nitrite: Negative   Leuk Esterase: Small / RBC: 5 /hpf / WBC 7 /HPF   Sq Epi: x / Non Sq Epi: 4 /hpf / Bacteria: Negative        INs and OUTs:        < from: CT Abdomen and Pelvis w/ Oral Cont and w/ IV Cont (19 @ 16:35) >    IMPRESSION:     Acute perforated diverticulitis. Two segments of diverticulitis are   appreciated within the distal descending colon, the other in the proximal   sigmoid colon with a noninflamed.  Recommend colonoscopy once the   inflammation has subsided.    Common bile duct measuring approximately 1 cm, indeterminate.    Diffuse hepatic steatosis.    Layering relatively high density material within the urinary bladder.   Correlate with urinalysis.    < end of copied text >

## 2019-04-13 NOTE — ED PROVIDER NOTE - PROGRESS NOTE DETAILS
KATHLEEN Guerin: paged surgery regarding divertic perf. awaiting call back. Started patient on zosyn. Updated patient regarding results. awaiting surgery call back

## 2019-04-13 NOTE — ED PROVIDER NOTE - OBJECTIVE STATEMENT
Patient presenting complaining of LLQ pain ongoing for last 3 days.  Associated chills, nausea at home, no vomiting.  Believes she has a history of diverticulosis in past.  Trying alkaseltzer at home without relief.  Associated decreased appetite, no diarrhea.  Chronic SOB ongoing since recent diagnosis of PNA, unchanged from baseline.   No prior abdominal surgeries.    PMH:  Negative  PSH:  Negative  Social History: former smoker, no alcohol

## 2019-04-13 NOTE — ED PROVIDER NOTE - ATTENDING CONTRIBUTION TO CARE
Patient seen and evaluated with resident/NP/PA, however HPI, ROS, PE and MDM as documented authored by myself unless otherwise noted- Rolly Michaels MD

## 2019-04-13 NOTE — H&P ADULT - NSHPPHYSICALEXAM_GEN_ALL_CORE
PHYSICAL EXAM:  GENERAL: NAD, well-developed  HEAD:  Atraumatic, Normocephalic  EYES: EOMI, conjunctiva and sclera clear  NECK: Supple, No JVD  CHEST/LUNG: Clear to auscultation bilaterally; No wheeze  HEART: Regular rate and rhythm; No murmurs, rubs, or gallops  ABDOMEN: Soft, LLQ tenderness, , nondistended  EXTREMITIES:  2+ Peripheral Pulses, No clubbing, cyanosis, or edema  PSYCH: AAOx3  NEUROLOGY: non-focal  SKIN: No rashes or lesions

## 2019-04-13 NOTE — H&P ADULT - ASSESSMENT
63F Hx asthma presents with sigmoid diverticulitis for 2 days.  Afebrile, WBC 13.7    - Admit to [facility] surgery floor  - Zosyn IV for antibiosis  - NPO  - IV fluids  - Pain control prn  - Nausea control prn  - Abdominal exams  - F/u am labs  - Discussed with attending    [facility]  [id number]

## 2019-04-13 NOTE — H&P ADULT - HISTORY OF PRESENT ILLNESS
Patient is a 63F Hx asthma presents with 2 days of abdominal pain. The pain is in the LLQ, is sharp, and constant. 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 She had subjective chills, nausea, no vomiting. No CP but she has underlying SOB. Tolerating diet. She had a colonoscopy 8 years which was normal. She also had one ~15 years ago that showed diverticulosis. Denies urinary changes.

## 2019-04-13 NOTE — ED PROVIDER NOTE - CLINICAL SUMMARY MEDICAL DECISION MAKING FREE TEXT BOX
Patient with LLQ abdominal pains suspicious for diverticulitis - plan for labs, pain control, antiemetics, CT A/P

## 2019-04-14 LAB
ALBUMIN SERPL ELPH-MCNC: 3.2 G/DL — LOW (ref 3.3–5)
ALP SERPL-CCNC: 57 U/L — SIGNIFICANT CHANGE UP (ref 40–120)
ALT FLD-CCNC: 13 U/L — SIGNIFICANT CHANGE UP (ref 10–45)
ANION GAP SERPL CALC-SCNC: 11 MMOL/L — SIGNIFICANT CHANGE UP (ref 5–17)
AST SERPL-CCNC: 11 U/L — SIGNIFICANT CHANGE UP (ref 10–40)
BILIRUB DIRECT SERPL-MCNC: 0.3 MG/DL — HIGH (ref 0–0.2)
BILIRUB INDIRECT FLD-MCNC: 1.2 MG/DL — HIGH (ref 0.2–1)
BILIRUB SERPL-MCNC: 1.5 MG/DL — HIGH (ref 0.2–1.2)
BUN SERPL-MCNC: 13 MG/DL — SIGNIFICANT CHANGE UP (ref 7–23)
CALCIUM SERPL-MCNC: 9 MG/DL — SIGNIFICANT CHANGE UP (ref 8.4–10.5)
CHLORIDE SERPL-SCNC: 102 MMOL/L — SIGNIFICANT CHANGE UP (ref 96–108)
CO2 SERPL-SCNC: 26 MMOL/L — SIGNIFICANT CHANGE UP (ref 22–31)
CREAT SERPL-MCNC: 0.98 MG/DL — SIGNIFICANT CHANGE UP (ref 0.5–1.3)
GLUCOSE SERPL-MCNC: 95 MG/DL — SIGNIFICANT CHANGE UP (ref 70–99)
HCT VFR BLD CALC: 36.1 % — SIGNIFICANT CHANGE UP (ref 34.5–45)
HGB BLD-MCNC: 11.1 G/DL — LOW (ref 11.5–15.5)
MAGNESIUM SERPL-MCNC: 2.2 MG/DL — SIGNIFICANT CHANGE UP (ref 1.6–2.6)
MCHC RBC-ENTMCNC: 26.8 PG — LOW (ref 27–34)
MCHC RBC-ENTMCNC: 30.7 GM/DL — LOW (ref 32–36)
MCV RBC AUTO: 87.2 FL — SIGNIFICANT CHANGE UP (ref 80–100)
PHOSPHATE SERPL-MCNC: 3.3 MG/DL — SIGNIFICANT CHANGE UP (ref 2.5–4.5)
PLATELET # BLD AUTO: 219 K/UL — SIGNIFICANT CHANGE UP (ref 150–400)
POTASSIUM SERPL-MCNC: 4 MMOL/L — SIGNIFICANT CHANGE UP (ref 3.5–5.3)
POTASSIUM SERPL-SCNC: 4 MMOL/L — SIGNIFICANT CHANGE UP (ref 3.5–5.3)
PROT SERPL-MCNC: 6.4 G/DL — SIGNIFICANT CHANGE UP (ref 6–8.3)
RBC # BLD: 4.14 M/UL — SIGNIFICANT CHANGE UP (ref 3.8–5.2)
RBC # FLD: 15.5 % — HIGH (ref 10.3–14.5)
SODIUM SERPL-SCNC: 139 MMOL/L — SIGNIFICANT CHANGE UP (ref 135–145)
WBC # BLD: 8.37 K/UL — SIGNIFICANT CHANGE UP (ref 3.8–10.5)
WBC # FLD AUTO: 8.37 K/UL — SIGNIFICANT CHANGE UP (ref 3.8–10.5)

## 2019-04-14 PROCEDURE — 99232 SBSQ HOSP IP/OBS MODERATE 35: CPT

## 2019-04-14 RX ORDER — ACETAMINOPHEN 500 MG
1000 TABLET ORAL ONCE
Qty: 0 | Refills: 0 | Status: COMPLETED | OUTPATIENT
Start: 2019-04-14 | End: 2019-04-14

## 2019-04-14 RX ORDER — DEXTROSE MONOHYDRATE, SODIUM CHLORIDE, AND POTASSIUM CHLORIDE 50; .745; 4.5 G/1000ML; G/1000ML; G/1000ML
1000 INJECTION, SOLUTION INTRAVENOUS
Qty: 0 | Refills: 0 | Status: DISCONTINUED | OUTPATIENT
Start: 2019-04-14 | End: 2019-04-15

## 2019-04-14 RX ORDER — MORPHINE SULFATE 50 MG/1
2 CAPSULE, EXTENDED RELEASE ORAL ONCE
Qty: 0 | Refills: 0 | Status: DISCONTINUED | OUTPATIENT
Start: 2019-04-14 | End: 2019-04-14

## 2019-04-14 RX ADMIN — PIPERACILLIN AND TAZOBACTAM 25 GRAM(S): 4; .5 INJECTION, POWDER, LYOPHILIZED, FOR SOLUTION INTRAVENOUS at 11:57

## 2019-04-14 RX ADMIN — PIPERACILLIN AND TAZOBACTAM 25 GRAM(S): 4; .5 INJECTION, POWDER, LYOPHILIZED, FOR SOLUTION INTRAVENOUS at 18:11

## 2019-04-14 RX ADMIN — PIPERACILLIN AND TAZOBACTAM 25 GRAM(S): 4; .5 INJECTION, POWDER, LYOPHILIZED, FOR SOLUTION INTRAVENOUS at 03:49

## 2019-04-14 RX ADMIN — Medication 400 MILLIGRAM(S): at 04:58

## 2019-04-14 RX ADMIN — DEXTROSE MONOHYDRATE, SODIUM CHLORIDE, AND POTASSIUM CHLORIDE 125 MILLILITER(S): 50; .745; 4.5 INJECTION, SOLUTION INTRAVENOUS at 09:59

## 2019-04-14 RX ADMIN — ENOXAPARIN SODIUM 40 MILLIGRAM(S): 100 INJECTION SUBCUTANEOUS at 11:57

## 2019-04-14 RX ADMIN — MORPHINE SULFATE 2 MILLIGRAM(S): 50 CAPSULE, EXTENDED RELEASE ORAL at 01:31

## 2019-04-14 RX ADMIN — Medication 1000 MILLIGRAM(S): at 18:00

## 2019-04-14 RX ADMIN — Medication 400 MILLIGRAM(S): at 17:30

## 2019-04-14 RX ADMIN — MORPHINE SULFATE 2 MILLIGRAM(S): 50 CAPSULE, EXTENDED RELEASE ORAL at 01:01

## 2019-04-14 NOTE — PROGRESS NOTE ADULT - SUBJECTIVE AND OBJECTIVE BOX
pt was seen and examined this morning. Pain well controlled with medication. no nausea or vomiting       Vital Signs Last 24 Hrs  T(C): 36.3 (2019 09:22), Max: 37.9 (2019 19:36)  T(F): 97.4 (2019 09:22), Max: 100.2 (2019 19:36)  HR: 66 (:22) (65 - 98)  BP: 148/84 (:22) (117/77 - 148/84)  BP(mean): 91 (2019 19:36) (91 - 91)  RR: 18 (2019 09:22) (16 - 18)  SpO2: 94% (:22) (93% - 99%)    I&O's Detail    2019 07:01  -  2019 07:00  --------------------------------------------------------  IN:    lactated ringers.: 1125 mL    Solution: 200 mL  Total IN: 1325 mL    OUT:    Voided: 250 mL  Total OUT: 250 mL    Total NET: 1075 mL      2019 07:01  -  2019 09:42  --------------------------------------------------------  IN:  Total IN: 0 mL    OUT:    Voided: 450 mL  Total OUT: 450 mL    Total NET: -450 mL          MEDICATIONS  (STANDING):  dextrose 5% + sodium chloride 0.9% with potassium chloride 20 mEq/L 1000 milliLiter(s) (125 mL/Hr) IV Continuous <Continuous>  enoxaparin Injectable 40 milliGRAM(s) SubCutaneous daily  piperacillin/tazobactam IVPB. 3.375 Gram(s) IV Intermittent every 8 hours    MEDICATIONS  (PRN):  ondansetron Injectable 4 milliGRAM(s) IV Push every 6 hours PRN Nausea and/or Vomiting      LABS:                        13.7   13.7  )-----------( 258      ( 2019 13:25 )             40.6     04-14    139  |  102  |  13  ----------------------------<  95  4.0   |  26  |  0.98    Ca    9.0      2019 07:29  Phos  3.3     04-14  Mg     2.2     04-14    TPro  6.4  /  Alb  3.2<L>  /  TBili  1.5<H>  /  DBili  0.3<H>  /  AST  11  /  ALT  13  /  AlkPhos  57  04-14      Urinalysis Basic - ( 2019 15:39 )    Color: Yellow / Appearance: Slightly Turbid / S.027 / pH: x  Gluc: x / Ketone: Negative  / Bili: Negative / Urobili: 8 mg/dL   Blood: x / Protein: 30 mg/dL / Nitrite: Negative   Leuk Esterase: Small / RBC: 5 /hpf / WBC 7 /HPF   Sq Epi: x / Non Sq Epi: 4 /hpf / Bacteria: Negative      LIVER FUNCTIONS - ( 2019 07:29 )  Alb: 3.2 g/dL / Pro: 6.4 g/dL / ALK PHOS: 57 U/L / ALT: 13 U/L / AST: 11 U/L / GGT: x             Physical exam   no acute distress  resp: non labored   abd. soft, non distended

## 2019-04-14 NOTE — PROGRESS NOTE ADULT - ASSESSMENT
63F Hx asthma presents with sigmoid diverticulitis for 2 days.  Afebrile, WBC 13.7    - Zosyn IV for antibiosis  - NPO  - IV fluids  - Pain control prn  - Nausea control prn  - Abdominal exams  - F/u am labs

## 2019-04-15 DIAGNOSIS — Z29.9 ENCOUNTER FOR PROPHYLACTIC MEASURES, UNSPECIFIED: ICD-10-CM

## 2019-04-15 DIAGNOSIS — G47.33 OBSTRUCTIVE SLEEP APNEA (ADULT) (PEDIATRIC): ICD-10-CM

## 2019-04-15 DIAGNOSIS — J45.909 UNSPECIFIED ASTHMA, UNCOMPLICATED: ICD-10-CM

## 2019-04-15 DIAGNOSIS — K57.80 DIVERTICULITIS OF INTESTINE, PART UNSPECIFIED, WITH PERFORATION AND ABSCESS WITHOUT BLEEDING: ICD-10-CM

## 2019-04-15 LAB
ANION GAP SERPL CALC-SCNC: 10 MMOL/L — SIGNIFICANT CHANGE UP (ref 5–17)
BUN SERPL-MCNC: 6 MG/DL — LOW (ref 7–23)
CALCIUM SERPL-MCNC: 8.8 MG/DL — SIGNIFICANT CHANGE UP (ref 8.4–10.5)
CHLORIDE SERPL-SCNC: 105 MMOL/L — SIGNIFICANT CHANGE UP (ref 96–108)
CO2 SERPL-SCNC: 26 MMOL/L — SIGNIFICANT CHANGE UP (ref 22–31)
CREAT SERPL-MCNC: 0.8 MG/DL — SIGNIFICANT CHANGE UP (ref 0.5–1.3)
GLUCOSE SERPL-MCNC: 128 MG/DL — HIGH (ref 70–99)
HCT VFR BLD CALC: 36.4 % — SIGNIFICANT CHANGE UP (ref 34.5–45)
HGB BLD-MCNC: 11 G/DL — LOW (ref 11.5–15.5)
MAGNESIUM SERPL-MCNC: 2 MG/DL — SIGNIFICANT CHANGE UP (ref 1.6–2.6)
MCHC RBC-ENTMCNC: 26.7 PG — LOW (ref 27–34)
MCHC RBC-ENTMCNC: 30.2 GM/DL — LOW (ref 32–36)
MCV RBC AUTO: 88.3 FL — SIGNIFICANT CHANGE UP (ref 80–100)
PHOSPHATE SERPL-MCNC: 2.4 MG/DL — LOW (ref 2.5–4.5)
PLATELET # BLD AUTO: 228 K/UL — SIGNIFICANT CHANGE UP (ref 150–400)
POTASSIUM SERPL-MCNC: 4.2 MMOL/L — SIGNIFICANT CHANGE UP (ref 3.5–5.3)
POTASSIUM SERPL-SCNC: 4.2 MMOL/L — SIGNIFICANT CHANGE UP (ref 3.5–5.3)
RBC # BLD: 4.12 M/UL — SIGNIFICANT CHANGE UP (ref 3.8–5.2)
RBC # FLD: 15.1 % — HIGH (ref 10.3–14.5)
SODIUM SERPL-SCNC: 141 MMOL/L — SIGNIFICANT CHANGE UP (ref 135–145)
WBC # BLD: 6.06 K/UL — SIGNIFICANT CHANGE UP (ref 3.8–10.5)
WBC # FLD AUTO: 6.06 K/UL — SIGNIFICANT CHANGE UP (ref 3.8–10.5)

## 2019-04-15 PROCEDURE — 99232 SBSQ HOSP IP/OBS MODERATE 35: CPT

## 2019-04-15 PROCEDURE — 99223 1ST HOSP IP/OBS HIGH 75: CPT

## 2019-04-15 RX ORDER — ACETAMINOPHEN 500 MG
1000 TABLET ORAL ONCE
Qty: 0 | Refills: 0 | Status: COMPLETED | OUTPATIENT
Start: 2019-04-15 | End: 2019-04-15

## 2019-04-15 RX ORDER — DOCUSATE SODIUM 100 MG
100 CAPSULE ORAL DAILY
Qty: 0 | Refills: 0 | Status: DISCONTINUED | OUTPATIENT
Start: 2019-04-15 | End: 2019-04-17

## 2019-04-15 RX ORDER — SENNA PLUS 8.6 MG/1
2 TABLET ORAL AT BEDTIME
Qty: 0 | Refills: 0 | Status: DISCONTINUED | OUTPATIENT
Start: 2019-04-15 | End: 2019-04-17

## 2019-04-15 RX ORDER — ACETAMINOPHEN 500 MG
1000 TABLET ORAL ONCE
Qty: 0 | Refills: 0 | Status: COMPLETED | OUTPATIENT
Start: 2019-04-15 | End: 2019-04-16

## 2019-04-15 RX ADMIN — PIPERACILLIN AND TAZOBACTAM 25 GRAM(S): 4; .5 INJECTION, POWDER, LYOPHILIZED, FOR SOLUTION INTRAVENOUS at 03:45

## 2019-04-15 RX ADMIN — Medication 1000 MILLIGRAM(S): at 05:28

## 2019-04-15 RX ADMIN — Medication 100 MILLIGRAM(S): at 12:26

## 2019-04-15 RX ADMIN — PIPERACILLIN AND TAZOBACTAM 25 GRAM(S): 4; .5 INJECTION, POWDER, LYOPHILIZED, FOR SOLUTION INTRAVENOUS at 20:58

## 2019-04-15 RX ADMIN — ENOXAPARIN SODIUM 40 MILLIGRAM(S): 100 INJECTION SUBCUTANEOUS at 12:20

## 2019-04-15 RX ADMIN — Medication 1000 MILLIGRAM(S): at 01:23

## 2019-04-15 RX ADMIN — Medication 62.5 MILLIMOLE(S): at 10:14

## 2019-04-15 RX ADMIN — PIPERACILLIN AND TAZOBACTAM 25 GRAM(S): 4; .5 INJECTION, POWDER, LYOPHILIZED, FOR SOLUTION INTRAVENOUS at 12:21

## 2019-04-15 RX ADMIN — Medication 400 MILLIGRAM(S): at 00:53

## 2019-04-15 NOTE — PROGRESS NOTE ADULT - ATTENDING COMMENTS
Patient seen and examined on AM rounds  Doing well  Still with RLQ pain  RLQ tenderness improved on exam  afebrile    WBC=WNL  Advance diet as tolerated

## 2019-04-15 NOTE — PROGRESS NOTE ADULT - ASSESSMENT
63F Hx asthma presents with sigmoid diverticulitis for 2 days.  Afebrile, WBC 13.7    - Zosyn IV for antibiosis  - Advance to [facility]  - IV fluids  - Pain control prn  - Abdominal exams  - F/u am labs    Trauma/ACS [id number]

## 2019-04-15 NOTE — CONSULT NOTE ADULT - ASSESSMENT
63 year old female with Hx asthma, JEFFRY presents with 2 days of abdominal pain found to have acute perforated diverticulitis

## 2019-04-15 NOTE — CONSULT NOTE ADULT - SUBJECTIVE AND OBJECTIVE BOX
HPI:  63 year old female with Hx asthma, JEFFRY presents with 2 days of abdominal pain. The pain is in the LLQ, is sharp, and constant. 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 She had subjective chills, nausea, no vomiting. No CP but she has underlying SOB. Tolerating diet. She had a colonoscopy 8 years which was normal. She also had one ~15 years ago that showed diverticulosis. Denies urinary changes.   Reports left lower quadrant pain, reports nausea when she ate clears today     PAST MEDICAL & SURGICAL HISTORY:  Asthma, unspecified asthma severity, unspecified whether complicated, unspecified whether persistent  Bronchitis  JEFFRY (obstructive sleep apnea)  No significant past surgical history      Review of Systems:   CONSTITUTIONAL: No fever  EYES: No eye pain  ENMT:  No difficulty hearing  NECK: No pain or stiffness  RESPIRATORY: No cough, wheezing, chills or hemoptysis; No shortness of breath  CARDIOVASCULAR: No chest pain, palpitations, dizziness, or leg swelling  GASTROINTESTINAL: LLQ abdominal pain, nausea, has not had bm yet  GENITOURINARY: No dysuria  NEUROLOGICAL: No headaches, memory loss  SKIN: No itching  ENDOCRINE: No heat or cold intolerance; No hair loss  MUSCULOSKELETAL: No joint pain or swelling;   PSYCHIATRIC: No depression  ALLERY AND IMMUNOLOGIC: No hives or eczema    Allergies    ammonia, peroxide, hair dye (Rash)  No Known Drug Allergies  shellfish (Short breath; Hives)    Intolerances        Social History: denies smoking or etoh use     FAMILY HISTORY:  FH: heart disease: Mom and dad      MEDICATIONS  (STANDING):  docusate sodium 100 milliGRAM(s) Oral daily  enoxaparin Injectable 40 milliGRAM(s) SubCutaneous daily  piperacillin/tazobactam IVPB. 3.375 Gram(s) IV Intermittent every 8 hours  senna 2 Tablet(s) Oral at bedtime    MEDICATIONS  (PRN):  ondansetron Injectable 4 milliGRAM(s) IV Push every 6 hours PRN Nausea and/or Vomiting        CAPILLARY BLOOD GLUCOSE        I&O's Summary    2019 07:01  -  15 Apr 2019 07:00  --------------------------------------------------------  IN: 3586 mL / OUT: 1600 mL / NET: 1986 mL    15 Apr 2019 07:01  -  15 Apr 2019 14:37  --------------------------------------------------------  IN: 1060 mL / OUT: 1600 mL / NET: -540 mL        PHYSICAL EXAM:  GENERAL: NAD, well-developed  HEAD:  Atraumatic, Normocephalic  EYES: conjunctiva and sclera clear  NECK: No JVD  CHEST/LUNG: Clear to auscultation bilaterally; No wheeze  HEART: Regular rate and rhythm; S1S2  ABDOMEN: Soft, Nondistended; Bowel sounds present, LLQ ttp   EXTREMITIES:  2+ Peripheral Pulses, No clubbing, cyanosis, or edema  PSYCH: AAOx3  NEUROLOGY: non-focal  SKIN: No rashes or lesions    LABS:                        11.0   6.06  )-----------( 228      ( 15 Apr 2019 11:26 )             36.4     04-15    141  |  105  |  6<L>  ----------------------------<  128<H>  4.2   |  26  |  0.80    Ca    8.8      15 Apr 2019 07:19  Phos  2.4     -15  Mg     2.0     04-15    TPro  6.4  /  Alb  3.2<L>  /  TBili  1.5<H>  /  DBili  0.3<H>  /  AST  11  /  ALT  13  /  AlkPhos  57  04-14          Urinalysis Basic - ( 2019 15:39 )    Color: Yellow / Appearance: Slightly Turbid / S.027 / pH: x  Gluc: x / Ketone: Negative  / Bili: Negative / Urobili: 8 mg/dL   Blood: x / Protein: 30 mg/dL / Nitrite: Negative   Leuk Esterase: Small / RBC: 5 /hpf / WBC 7 /HPF   Sq Epi: x / Non Sq Epi: 4 /hpf / Bacteria: Negative        RADIOLOGY & ADDITIONAL TESTS:    Imaging Personally Reviewed:    Consultant(s) Notes Reviewed:  sx    Care Discussed with Consultants/Other Providers: sx

## 2019-04-15 NOTE — PROGRESS NOTE ADULT - SUBJECTIVE AND OBJECTIVE BOX
SURGERY DAILY PROGRESS NOTE:       SUBJECTIVE/ROS: Patient examined at bedside. No acute events overnight. Tolerates sips w/o N/V/worsening pain. Loose BM x1. OOB.          MEDICATIONS  (STANDING):  dextrose 5% + sodium chloride 0.9% with potassium chloride 20 mEq/L 1000 milliLiter(s) (125 mL/Hr) IV Continuous <Continuous>  enoxaparin Injectable 40 milliGRAM(s) SubCutaneous daily  piperacillin/tazobactam IVPB. 3.375 Gram(s) IV Intermittent every 8 hours    MEDICATIONS  (PRN):  ondansetron Injectable 4 milliGRAM(s) IV Push every 6 hours PRN Nausea and/or Vomiting      OBJECTIVE:    Vital Signs Last 24 Hrs  T(C): 36.8 (15 Apr 2019 06:34), Max: 37 (2019 17:00)  T(F): 98.2 (15 Apr 2019 06:34), Max: 98.6 (2019 17:00)  HR: 66 (15 Apr 2019 06:34) (66 - 77)  BP: 140/82 (15 Apr 2019 06:34) (135/79 - 160/98)  BP(mean): --  RR: 18 (15 Apr 2019 06:34) (18 - 18)  SpO2: 96% (15 Apr 2019 06:34) (94% - 97%)        I&O's Detail    2019 07:01  -  15 Apr 2019 07:00  --------------------------------------------------------  IN:    dextrose 5% + sodium chloride 0.9% with potassium chloride 20 mEq/L: 3000 mL    Oral Fluid: 85 mL    Solution: 401 mL    Solution: 100 mL  Total IN: 3586 mL    OUT:    Voided: 1600 mL  Total OUT: 1600 mL    Total NET: 1986 mL          Daily     Daily     LABS:                        11.1   8.37  )-----------( 219      ( 2019 10:18 )             36.1     04-14    139  |  102  |  13  ----------------------------<  95  4.0   |  26  |  0.98    Ca    9.0      2019 07:29  Phos  3.3     04-14  Mg     2.2     -14    TPro  6.4  /  Alb  3.2<L>  /  TBili  1.5<H>  /  DBili  0.3<H>  /  AST  11  /  ALT  13  /  AlkPhos  57  -14      Urinalysis Basic - ( 2019 15:39 )    Color: Yellow / Appearance: Slightly Turbid / S.027 / pH: x  Gluc: x / Ketone: Negative  / Bili: Negative / Urobili: 8 mg/dL   Blood: x / Protein: 30 mg/dL / Nitrite: Negative   Leuk Esterase: Small / RBC: 5 /hpf / WBC 7 /HPF   Sq Epi: x / Non Sq Epi: 4 /hpf / Bacteria: Negative                PHYSICAL EXAM:  GEN: NAD  Pulm: unlabored breathing on RA  CV: radial pulse 2+, cap refil <2sec  Abd: soft, LLQ tenderness, mildly nondistended

## 2019-04-15 NOTE — CONSULT NOTE ADULT - PROBLEM SELECTOR RECOMMENDATION 9
Pt found to have acute perforated diverticulitis.   C/w IV zosyn day 2  Clears diet   Tylenol prn for pain control   Serial abdominal exams   Sx follow up

## 2019-04-16 LAB
ANION GAP SERPL CALC-SCNC: 14 MMOL/L — SIGNIFICANT CHANGE UP (ref 5–17)
BUN SERPL-MCNC: 5 MG/DL — LOW (ref 7–23)
CALCIUM SERPL-MCNC: 9.7 MG/DL — SIGNIFICANT CHANGE UP (ref 8.4–10.5)
CHLORIDE SERPL-SCNC: 104 MMOL/L — SIGNIFICANT CHANGE UP (ref 96–108)
CO2 SERPL-SCNC: 23 MMOL/L — SIGNIFICANT CHANGE UP (ref 22–31)
CREAT SERPL-MCNC: 0.9 MG/DL — SIGNIFICANT CHANGE UP (ref 0.5–1.3)
GLUCOSE SERPL-MCNC: 94 MG/DL — SIGNIFICANT CHANGE UP (ref 70–99)
HCT VFR BLD CALC: 36.7 % — SIGNIFICANT CHANGE UP (ref 34.5–45)
HGB BLD-MCNC: 11.6 G/DL — SIGNIFICANT CHANGE UP (ref 11.5–15.5)
MAGNESIUM SERPL-MCNC: 2.1 MG/DL — SIGNIFICANT CHANGE UP (ref 1.6–2.6)
MCHC RBC-ENTMCNC: 27.1 PG — SIGNIFICANT CHANGE UP (ref 27–34)
MCHC RBC-ENTMCNC: 31.6 GM/DL — LOW (ref 32–36)
MCV RBC AUTO: 85.7 FL — SIGNIFICANT CHANGE UP (ref 80–100)
PHOSPHATE SERPL-MCNC: 4.1 MG/DL — SIGNIFICANT CHANGE UP (ref 2.5–4.5)
PLATELET # BLD AUTO: 265 K/UL — SIGNIFICANT CHANGE UP (ref 150–400)
POTASSIUM SERPL-MCNC: 4 MMOL/L — SIGNIFICANT CHANGE UP (ref 3.5–5.3)
POTASSIUM SERPL-SCNC: 4 MMOL/L — SIGNIFICANT CHANGE UP (ref 3.5–5.3)
RBC # BLD: 4.28 M/UL — SIGNIFICANT CHANGE UP (ref 3.8–5.2)
RBC # FLD: 15.1 % — HIGH (ref 10.3–14.5)
SODIUM SERPL-SCNC: 141 MMOL/L — SIGNIFICANT CHANGE UP (ref 135–145)
WBC # BLD: 7.02 K/UL — SIGNIFICANT CHANGE UP (ref 3.8–10.5)
WBC # FLD AUTO: 7.02 K/UL — SIGNIFICANT CHANGE UP (ref 3.8–10.5)

## 2019-04-16 PROCEDURE — 99233 SBSQ HOSP IP/OBS HIGH 50: CPT

## 2019-04-16 PROCEDURE — 99232 SBSQ HOSP IP/OBS MODERATE 35: CPT

## 2019-04-16 RX ORDER — ACETAMINOPHEN 500 MG
650 TABLET ORAL ONCE
Qty: 0 | Refills: 0 | Status: COMPLETED | OUTPATIENT
Start: 2019-04-16 | End: 2019-04-17

## 2019-04-16 RX ADMIN — ENOXAPARIN SODIUM 40 MILLIGRAM(S): 100 INJECTION SUBCUTANEOUS at 12:36

## 2019-04-16 RX ADMIN — PIPERACILLIN AND TAZOBACTAM 25 GRAM(S): 4; .5 INJECTION, POWDER, LYOPHILIZED, FOR SOLUTION INTRAVENOUS at 18:57

## 2019-04-16 RX ADMIN — PIPERACILLIN AND TAZOBACTAM 25 GRAM(S): 4; .5 INJECTION, POWDER, LYOPHILIZED, FOR SOLUTION INTRAVENOUS at 10:57

## 2019-04-16 RX ADMIN — Medication 400 MILLIGRAM(S): at 01:34

## 2019-04-16 RX ADMIN — PIPERACILLIN AND TAZOBACTAM 25 GRAM(S): 4; .5 INJECTION, POWDER, LYOPHILIZED, FOR SOLUTION INTRAVENOUS at 04:19

## 2019-04-16 RX ADMIN — Medication 1000 MILLIGRAM(S): at 02:00

## 2019-04-16 NOTE — DIETITIAN INITIAL EVALUATION ADULT. - ORAL INTAKE PTA
Pt reports good appetite and PO intake PTA. Pt consumes 3 meals daily, has takeout often. Pt takes multivitamin, turmeric, D3, B12 and calcium. Confirmed shellfish allergy./good

## 2019-04-16 NOTE — DIETITIAN INITIAL EVALUATION ADULT. - ENERGY NEEDS
Ht: 63 Wt: 225 pounds BMI: 39.9 kg/m2 IBW:  115 pounds(+/-10%)   +1 generalized edema, +1 bilateral foot, ankle, knee edema. No pressure ulcers documented.

## 2019-04-16 NOTE — PROGRESS NOTE ADULT - ATTENDING COMMENTS
Patient seen and examined on AM rounds  Doing well, pain much improved from admission  abd - mild LLQ tenderness, nondistended, soft  WBC=WNL    - advance diet as tolerating  - continue antibiotics  - discussed with patient need for colonoscopy as outpatient

## 2019-04-16 NOTE — PROGRESS NOTE ADULT - SUBJECTIVE AND OBJECTIVE BOX
SURGERY DAILY PROGRESS NOTE:       SUBJECTIVE/ROS: Patient examined at bedside. No acute events overnight. Tolerates sips w/o N/V/worsening pain. Loose BM x1. OOB.          Vital Signs Last 24 Hrs  T(C): 36.7 (16 Apr 2019 09:26), Max: 37.1 (15 Apr 2019 17:37)  T(F): 98 (16 Apr 2019 09:26), Max: 98.8 (15 Apr 2019 17:37)  HR: 69 (16 Apr 2019 09:26) (65 - 76)  BP: 150/86 (16 Apr 2019 09:26) (129/83 - 169/91)  BP(mean): --  RR: 18 (16 Apr 2019 09:26) (18 - 18)  SpO2: 96% (16 Apr 2019 09:26) (95% - 96%)    I&O's Detail    15 Apr 2019 07:01  -  16 Apr 2019 07:00  --------------------------------------------------------  IN:    Oral Fluid: 1300 mL    Solution: 250 mL    Solution: 300 mL  Total IN: 1850 mL    OUT:    Voided: 3725 mL  Total OUT: 3725 mL    Total NET: -1875 mL      16 Apr 2019 07:01  -  16 Apr 2019 10:28  --------------------------------------------------------  IN:    Oral Fluid: 560 mL  Total IN: 560 mL    OUT:    Voided: 600 mL  Total OUT: 600 mL    Total NET: -40 mL          MEDICATIONS  (STANDING):  docusate sodium 100 milliGRAM(s) Oral daily  enoxaparin Injectable 40 milliGRAM(s) SubCutaneous daily  piperacillin/tazobactam IVPB. 3.375 Gram(s) IV Intermittent every 8 hours  senna 2 Tablet(s) Oral at bedtime    MEDICATIONS  (PRN):  ondansetron Injectable 4 milliGRAM(s) IV Push every 6 hours PRN Nausea and/or Vomiting      LABS:                        11.6   7.02  )-----------( 265      ( 16 Apr 2019 09:33 )             36.7     04-16    141  |  104  |  5<L>  ----------------------------<  94  4.0   |  23  |  0.90    Ca    9.7      16 Apr 2019 06:48  Phos  4.1     04-16  Mg     2.1     04-16                            PHYSICAL EXAM:  GEN: NAD  Pulm: unlabored breathing on RA  CV: radial pulse 2+, cap refil <2sec  Abd: soft, LLQ tenderness, mildly nondistended

## 2019-04-16 NOTE — DIETITIAN INITIAL EVALUATION ADULT. - OTHER INFO
Pt seen for inadequate diet day #4. Per chart, pt presents with 2 days of abdominal pain found to have acute perforated diverticulitis. On Zosyn IV. Pt currently on clear liquid diet, reports tolerating. Pt had broth, italian ice and juice for lunch. Pt denies any history of chewing/swallowing difficulty or GI distress at this time. Last BM yesterday. Pt educated on diet advancement and low fiber nutrition therapy.

## 2019-04-16 NOTE — PROGRESS NOTE ADULT - ASSESSMENT
63F Hx asthma presents with sigmoid diverticulitis for 2 days.  Afebrile, WBC 13.7    - Zosyn IV for antibiosis  - IV fluids  - Pain control prn  - Abdominal exams  - F/u am labs    Trauma/ACS [id number]

## 2019-04-16 NOTE — PROGRESS NOTE ADULT - SUBJECTIVE AND OBJECTIVE BOX
Patient is a 63y old  Female who presents with a chief complaint of Diverticulitis (16 Apr 2019 10:27)      SUBJECTIVE / OVERNIGHT EVENTS: feels better, abdominal pain is better, nausea improved, no cp, sob     MEDICATIONS  (STANDING):  docusate sodium 100 milliGRAM(s) Oral daily  enoxaparin Injectable 40 milliGRAM(s) SubCutaneous daily  piperacillin/tazobactam IVPB. 3.375 Gram(s) IV Intermittent every 8 hours  senna 2 Tablet(s) Oral at bedtime    MEDICATIONS  (PRN):  ondansetron Injectable 4 milliGRAM(s) IV Push every 6 hours PRN Nausea and/or Vomiting        CAPILLARY BLOOD GLUCOSE        I&O's Summary    15 Apr 2019 07:01  -  16 Apr 2019 07:00  --------------------------------------------------------  IN: 1850 mL / OUT: 3725 mL / NET: -1875 mL    16 Apr 2019 07:01  -  16 Apr 2019 14:32  --------------------------------------------------------  IN: 1140 mL / OUT: 1300 mL / NET: -160 mL        PHYSICAL EXAM:  GENERAL: NAD, well-developed  HEAD:  Atraumatic, Normocephalic  EYES: conjunctiva and sclera clear  NECK: No JVD  CHEST/LUNG: Clear to auscultation bilaterally; No wheeze  HEART: Regular rate and rhythm; S1S2  ABDOMEN: Soft, llq ttp, Nondistended; Bowel sounds present  EXTREMITIES:  2+ Peripheral Pulses, No clubbing, cyanosis, or edema  PSYCH: AAOx3  NEUROLOGY: non-focal  SKIN: No rashes or lesions    LABS:                        11.6   7.02  )-----------( 265      ( 16 Apr 2019 09:33 )             36.7     04-16    141  |  104  |  5<L>  ----------------------------<  94  4.0   |  23  |  0.90    Ca    9.7      16 Apr 2019 06:48  Phos  4.1     04-16  Mg     2.1     04-16                RADIOLOGY & ADDITIONAL TESTS:    Imaging Personally Reviewed:    Consultant(s) Notes Reviewed:  sx    Care Discussed with Consultants/Other Providers: sx

## 2019-04-16 NOTE — DIETITIAN INITIAL EVALUATION ADULT. - NS AS NUTRI INTERV ED CONTENT
Provided low-fiber nutrition therapy including importance of avoiding  fiber rich foods, fresh fruits/vegetables, whole grains, and added fiber in processed foods. Discussed chewing foods well and adequate hydration. Discussed slow/steady reintroduction of fiber pending MD recommendation at that time. Pt verbalized understanding.

## 2019-04-16 NOTE — PROGRESS NOTE ADULT - PROBLEM SELECTOR PLAN 1
Pt found to have acute perforated diverticulitis.   C/w IV zosyn day 3  Clears diet   Advance diet as tolerated   Tylenol prn for pain control   Serial abdominal exams   Sx follow up.

## 2019-04-17 ENCOUNTER — TRANSCRIPTION ENCOUNTER (OUTPATIENT)
Age: 64
End: 2019-04-17

## 2019-04-17 VITALS
RESPIRATION RATE: 18 BRPM | DIASTOLIC BLOOD PRESSURE: 74 MMHG | TEMPERATURE: 98 F | OXYGEN SATURATION: 94 % | HEART RATE: 67 BPM | SYSTOLIC BLOOD PRESSURE: 135 MMHG

## 2019-04-17 DIAGNOSIS — E83.39 OTHER DISORDERS OF PHOSPHORUS METABOLISM: ICD-10-CM

## 2019-04-17 LAB
ANION GAP SERPL CALC-SCNC: 14 MMOL/L — SIGNIFICANT CHANGE UP (ref 5–17)
BUN SERPL-MCNC: 13 MG/DL — SIGNIFICANT CHANGE UP (ref 7–23)
CALCIUM SERPL-MCNC: 9.7 MG/DL — SIGNIFICANT CHANGE UP (ref 8.4–10.5)
CHLORIDE SERPL-SCNC: 102 MMOL/L — SIGNIFICANT CHANGE UP (ref 96–108)
CO2 SERPL-SCNC: 24 MMOL/L — SIGNIFICANT CHANGE UP (ref 22–31)
CREAT SERPL-MCNC: 1 MG/DL — SIGNIFICANT CHANGE UP (ref 0.5–1.3)
GLUCOSE SERPL-MCNC: 95 MG/DL — SIGNIFICANT CHANGE UP (ref 70–99)
HCT VFR BLD CALC: 42.8 % — SIGNIFICANT CHANGE UP (ref 34.5–45)
HGB BLD-MCNC: 13.2 G/DL — SIGNIFICANT CHANGE UP (ref 11.5–15.5)
MAGNESIUM SERPL-MCNC: 2.1 MG/DL — SIGNIFICANT CHANGE UP (ref 1.6–2.6)
MCHC RBC-ENTMCNC: 26.5 PG — LOW (ref 27–34)
MCHC RBC-ENTMCNC: 30.8 GM/DL — LOW (ref 32–36)
MCV RBC AUTO: 85.9 FL — SIGNIFICANT CHANGE UP (ref 80–100)
PHOSPHATE SERPL-MCNC: 4.5 MG/DL — SIGNIFICANT CHANGE UP (ref 2.5–4.5)
PLATELET # BLD AUTO: 310 K/UL — SIGNIFICANT CHANGE UP (ref 150–400)
POTASSIUM SERPL-MCNC: 4.3 MMOL/L — SIGNIFICANT CHANGE UP (ref 3.5–5.3)
POTASSIUM SERPL-SCNC: 4.3 MMOL/L — SIGNIFICANT CHANGE UP (ref 3.5–5.3)
RBC # BLD: 4.98 M/UL — SIGNIFICANT CHANGE UP (ref 3.8–5.2)
RBC # FLD: 15.2 % — HIGH (ref 10.3–14.5)
SODIUM SERPL-SCNC: 140 MMOL/L — SIGNIFICANT CHANGE UP (ref 135–145)
WBC # BLD: 7.36 K/UL — SIGNIFICANT CHANGE UP (ref 3.8–10.5)
WBC # FLD AUTO: 7.36 K/UL — SIGNIFICANT CHANGE UP (ref 3.8–10.5)

## 2019-04-17 PROCEDURE — 74177 CT ABD & PELVIS W/CONTRAST: CPT

## 2019-04-17 PROCEDURE — 99232 SBSQ HOSP IP/OBS MODERATE 35: CPT

## 2019-04-17 PROCEDURE — 83605 ASSAY OF LACTIC ACID: CPT

## 2019-04-17 PROCEDURE — 82435 ASSAY OF BLOOD CHLORIDE: CPT

## 2019-04-17 PROCEDURE — 96375 TX/PRO/DX INJ NEW DRUG ADDON: CPT

## 2019-04-17 PROCEDURE — 99285 EMERGENCY DEPT VISIT HI MDM: CPT | Mod: 25

## 2019-04-17 PROCEDURE — 80048 BASIC METABOLIC PNL TOTAL CA: CPT

## 2019-04-17 PROCEDURE — 83735 ASSAY OF MAGNESIUM: CPT

## 2019-04-17 PROCEDURE — 82330 ASSAY OF CALCIUM: CPT

## 2019-04-17 PROCEDURE — 82803 BLOOD GASES ANY COMBINATION: CPT

## 2019-04-17 PROCEDURE — 84295 ASSAY OF SERUM SODIUM: CPT

## 2019-04-17 PROCEDURE — 80053 COMPREHEN METABOLIC PANEL: CPT

## 2019-04-17 PROCEDURE — 85014 HEMATOCRIT: CPT

## 2019-04-17 PROCEDURE — 80076 HEPATIC FUNCTION PANEL: CPT

## 2019-04-17 PROCEDURE — 81001 URINALYSIS AUTO W/SCOPE: CPT

## 2019-04-17 PROCEDURE — 99253 IP/OBS CNSLTJ NEW/EST LOW 45: CPT

## 2019-04-17 PROCEDURE — 96374 THER/PROPH/DIAG INJ IV PUSH: CPT | Mod: XU

## 2019-04-17 PROCEDURE — 85027 COMPLETE CBC AUTOMATED: CPT

## 2019-04-17 PROCEDURE — 84132 ASSAY OF SERUM POTASSIUM: CPT

## 2019-04-17 PROCEDURE — 82947 ASSAY GLUCOSE BLOOD QUANT: CPT

## 2019-04-17 PROCEDURE — 99233 SBSQ HOSP IP/OBS HIGH 50: CPT

## 2019-04-17 PROCEDURE — 84100 ASSAY OF PHOSPHORUS: CPT

## 2019-04-17 RX ADMIN — Medication 650 MILLIGRAM(S): at 00:41

## 2019-04-17 RX ADMIN — Medication 650 MILLIGRAM(S): at 01:10

## 2019-04-17 RX ADMIN — PIPERACILLIN AND TAZOBACTAM 25 GRAM(S): 4; .5 INJECTION, POWDER, LYOPHILIZED, FOR SOLUTION INTRAVENOUS at 05:31

## 2019-04-17 NOTE — DISCHARGE NOTE PROVIDER - CONDITIONS AT DISCHARGE
Seen and examined by Attending Physician Dr. Louie who deemed, and cleared patient stable for discharge.

## 2019-04-17 NOTE — CONSULT NOTE ADULT - ASSESSMENT
1st episode of microperforated Hinchey II diverticulitis    Pt will likely go home today per primary team.   Cont PO abx to complete a total of 2 weeks  f/u with me in 1-2 weeks to discuss eventual colonoscopy and surgical options.

## 2019-04-17 NOTE — DISCHARGE NOTE NURSING/CASE MANAGEMENT/SOCIAL WORK - NSDCPNDISPN_GEN_ALL_CORE
Side effects of pain management treatment/Education provided on the pain management plan of care/Activities of daily living, including home environment that might     exacerbate pain or reduce effectiveness of the pain management plan of care as well as strategies to address these issues/Opioids not applicable/not prescribed/Safe use, storage and disposal of opioids when prescribed

## 2019-04-17 NOTE — DISCHARGE NOTE PROVIDER - NSDCCPCAREPLAN_GEN_ALL_CORE_FT
PRINCIPAL DISCHARGE DIAGNOSIS  Diagnosis: Diverticulitis of intestine with perforation  Assessment and Plan of Treatment: Patient seen and examined by colorectal Dr. Lucas, patient should follow up within one week of discharge from the hospital. Patient should continue on 10 more days of Augmentin. PRINCIPAL DISCHARGE DIAGNOSIS  Diagnosis: Diverticulitis of intestine with perforation  Assessment and Plan of Treatment: Patient seen and examined by colorectal Dr. Lucas, patient should follow up within one week of discharge from the hospital. Patient should continue on 10 more days of Augmentin.      SECONDARY DISCHARGE DIAGNOSES  Diagnosis: Prophylactic measure  Assessment and Plan of Treatment: Prophylactic measure    Diagnosis: JEFFRY (obstructive sleep apnea)  Assessment and Plan of Treatment: JEFFRY (obstructive sleep apnea). Patient currently not being treated, as per patients choice.    Diagnosis: Asthma, unspecified asthma severity, unspecified whether complicated, unspecified whether persistent  Assessment and Plan of Treatment: Asthma, unspecified asthma severity, unspecified whether complicated, unspecified whether persistent    Diagnosis: Diverticulitis of intestine with perforation  Assessment and Plan of Treatment: Diverticulitis of intestine with perforation, medically managed and treated with antibioitcs.    Diagnosis: Hypophosphatemia  Assessment and Plan of Treatment: Replaced with phosphours replacements, noted to resolve prior to discharge.

## 2019-04-17 NOTE — DISCHARGE NOTE PROVIDER - CARE PROVIDERS DIRECT ADDRESSES
[email or web address]

## 2019-04-17 NOTE — PROGRESS NOTE ADULT - SUBJECTIVE AND OBJECTIVE BOX
SURGERY DAILY PROGRESS NOTE:       SUBJECTIVE/ROS: Patient examined at bedside. No acute events overnight. Tolerates sips w/o N/V/worsening pain. Loose BM x1. OOB.          Vital Signs Last 24 Hrs  T(C): 36.5 (17 Apr 2019 05:19), Max: 36.7 (16 Apr 2019 09:26)  T(F): 97.7 (17 Apr 2019 05:19), Max: 98.1 (16 Apr 2019 17:00)  HR: 67 (17 Apr 2019 05:19) (67 - 78)  BP: 128/77 (17 Apr 2019 05:19) (126/76 - 156/76)  BP(mean): --  RR: 18 (17 Apr 2019 05:19) (17 - 18)  SpO2: 96% (17 Apr 2019 05:19) (95% - 96%)    I&O's Detail    16 Apr 2019 07:01  -  17 Apr 2019 07:00  --------------------------------------------------------  IN:    Oral Fluid: 1360 mL    Solution: 300 mL  Total IN: 1660 mL    OUT:    Voided: 2050 mL  Total OUT: 2050 mL    Total NET: -390 mL          MEDICATIONS  (STANDING):  docusate sodium 100 milliGRAM(s) Oral daily  enoxaparin Injectable 40 milliGRAM(s) SubCutaneous daily  piperacillin/tazobactam IVPB. 3.375 Gram(s) IV Intermittent every 8 hours  senna 2 Tablet(s) Oral at bedtime    MEDICATIONS  (PRN):  ondansetron Injectable 4 milliGRAM(s) IV Push every 6 hours PRN Nausea and/or Vomiting      LABS:                        11.6   7.02  )-----------( 265      ( 16 Apr 2019 09:33 )             36.7     04-17    140  |  102  |  13  ----------------------------<  95  4.3   |  24  |  1.00    Ca    9.7      17 Apr 2019 06:44  Phos  4.5     04-17  Mg     2.1     04-17                      PHYSICAL EXAM:  GEN: NAD  Pulm: unlabored breathing on RA  CV: radial pulse 2+, cap refil <2sec  Abd: soft, LLQ tenderness, mildly nondistended

## 2019-04-17 NOTE — DISCHARGE NOTE PROVIDER - NSDCFUADDAPPT_GEN_ALL_CORE_FT
Your CT scan showed a dilated Common Bile duct with elevated Total Bilirubin levels. It is important that you follow up with Dr. Brown (your PMD) within one week of discharge from the hospital to have this further evaluated.

## 2019-04-17 NOTE — PROGRESS NOTE ADULT - ASSESSMENT
63F Hx asthma presents with sigmoid diverticulitis for 2 days.  Afebrile, WBC 13.7    - Zosyn IV for antibiosis  - LRD  - Pain control prn  - Abdominal exams  - F/u am labs    Trauma/ACS [id number]

## 2019-04-17 NOTE — DISCHARGE NOTE PROVIDER - NSDCCPTREATMENT_GEN_ALL_CORE_FT
PRINCIPAL PROCEDURE  Procedure: CT scan abdomen  Findings and Treatment: Noted to have diverticulitis information included in CT summary.

## 2019-04-17 NOTE — PROGRESS NOTE ADULT - SUBJECTIVE AND OBJECTIVE BOX
Patient is a 63y old  Female who presents with a chief complaint of Diverticulitis (17 Apr 2019 10:35)      SUBJECTIVE / OVERNIGHT EVENTS: feels better, abdominal pain is better, tolerating diet, no n/v, chills,     MEDICATIONS  (STANDING):  amoxicillin  875 milliGRAM(s)/clavulanate 1 Tablet(s) Oral two times a day  docusate sodium 100 milliGRAM(s) Oral daily  enoxaparin Injectable 40 milliGRAM(s) SubCutaneous daily  senna 2 Tablet(s) Oral at bedtime    MEDICATIONS  (PRN):        CAPILLARY BLOOD GLUCOSE        I&O's Summary    16 Apr 2019 07:01  -  17 Apr 2019 07:00  --------------------------------------------------------  IN: 1660 mL / OUT: 2050 mL / NET: -390 mL    17 Apr 2019 07:01  -  17 Apr 2019 15:10  --------------------------------------------------------  IN: 360 mL / OUT: 200 mL / NET: 160 mL        PHYSICAL EXAM:  GENERAL: NAD, well-developed  HEAD:  Atraumatic, Normocephalic  EYES: EOMI, PERRLA, conjunctiva and sclera clear  NECK: Supple, No JVD  CHEST/LUNG: Clear to auscultation bilaterally; No wheeze  HEART: Regular rate and rhythm; No murmurs, rubs, or gallops  ABDOMEN: Soft, some tto llq  Nondistended; Bowel sounds present  EXTREMITIES:  2+ Peripheral Pulses, No clubbing, cyanosis, or edema  PSYCH: AAOx3  NEUROLOGY: non-focal  SKIN: No rashes or lesions    LABS:                        13.2   7.36  )-----------( 310      ( 17 Apr 2019 09:53 )             42.8     04-17    140  |  102  |  13  ----------------------------<  95  4.3   |  24  |  1.00    Ca    9.7      17 Apr 2019 06:44  Phos  4.5     04-17  Mg     2.1     04-17                RADIOLOGY & ADDITIONAL TESTS:    Imaging Personally Reviewed:    Consultant(s) Notes Reviewed:  sx    Care Discussed with Consultants/Other Providers: sx

## 2019-04-17 NOTE — DISCHARGE NOTE PROVIDER - NSDCFUADDINST_GEN_ALL_CORE_FT
ACTIVITY: Do not lift anything heavier than 10 lbs, which is about a gallon of milk. No Strenuous activity for about 6 weeks, and until cleared by your surgeon. Strenuous activity includes yoga, weight lifting, and cycling. Stairs and walking are ok. Otherwise, you may return to your usual level of physical activity. If you are taking narcotic pain medication (such as Percocet), do NOT drive a car, operate machinery or make important decisions.  DIET: Low Fiber Diet, NO raw fruits and vegetables   NOTIFY YOUR SURGEON IF: You have any bleeding that does not stop, any pus draining from your wound, any fever (over 100.4 F) or chills, persistent nausea/vomiting, persistent diarrhea, or if your pain is not controlled on your discharge pain medications.  FOLLOW-UP:  1. Please call to make a follow-up appointment within one week of discharge with Dr. Lucas.    2. Please follow up with your primary care physician in one week regarding your hospitalization.    Please notify your Attending Physician if after discharge you have fever, chills, abdominal pain, purulent drainage from your incision, in ability to pass flatus, not tolerating PO diet, abdominal bloating/distention, naseau and or vomiting. Additionally any acute changes as well. Please report back to the Emergency Department if unable to reach Physician. ACTIVITY: Do not lift anything heavier than 10 lbs, which is about a gallon of milk. No Strenuous activity for about 6 weeks, and until cleared by your surgeon. Strenuous activity includes yoga, weight lifting, and cycling. Stairs and walking are ok. Otherwise, you may return to your usual level of physical activity. If you are taking narcotic pain medication (such as Percocet), do NOT drive a car, operate machinery or make important decisions.  DIET: Low Fiber Diet, NO raw fruits and vegetables   NOTIFY YOUR SURGEON IF: You have any bleeding that does not stop, any pus draining from your wound, any fever (over 100.4 F) or chills, persistent nausea/vomiting, persistent diarrhea, or if your pain is not controlled on your discharge pain medications.  FOLLOW-UP:  1. Please call to make a follow-up appointment within one week of discharge with Dr. Lucas.    2. Please follow up with your primary care physician in one week regarding your hospitalization.  3. Please follow up with GI , due to noted dilation of common bile duct.     Please notify your Attending Physician if after discharge you have fever, chills, abdominal pain, purulent drainage from your incision, in ability to pass flatus, not tolerating PO diet, abdominal bloating/distention, naseau and or vomiting. Additionally any acute changes as well. Please report back to the Emergency Department if unable to reach Physician. ACTIVITY: Do not lift anything heavier than 10 lbs, which is about a gallon of milk. No Strenuous activity for about 6 weeks, and until cleared by your surgeon. Strenuous activity includes yoga, weight lifting, and cycling. Stairs and walking are ok. Otherwise, you may return to your usual level of physical activity. If you are taking narcotic pain medication (such as Percocet), do NOT drive a car, operate machinery or make important decisions.  DIET: Low Fiber Diet, NO raw fruits and vegetables   NOTIFY YOUR SURGEON IF: You have any bleeding that does not stop, any pus draining from your wound, any fever (over 100.4 F) or chills, persistent nausea/vomiting, persistent diarrhea, or if your pain is not controlled on your discharge pain medications.  FOLLOW-UP:  1. Please call to make a follow-up appointment within one week of discharge with Dr. Lucas.    2. Please follow up with your primary care physician in one week regarding your hospitalization.  3. Please follow up with GI and Dr. Brown, due to noted dilation of common bile duct. Your CT scan showed a dilated Common Bile duct with elevated Total Bilirubin levels. It is important that you follow up with Dr. Brown (your PMD) within one week of discharge from the hospital to have this further evaluated.     Please notify your Attending Physician if after discharge you have fever, chills, abdominal pain, purulent drainage from your incision, in ability to pass flatus, not tolerating PO diet, abdominal bloating/distention, naseau and or vomiting. Additionally any acute changes as well. Please report back to the Emergency Department if unable to reach Physician.

## 2019-04-17 NOTE — PROGRESS NOTE ADULT - PROBLEM SELECTOR PLAN 1
Pt found to have acute perforated diverticulitis.   Discharge on po augmentin for 10 days per sx  Low fiber diet   Tylenol prn for pain control   Serial abdominal exams   Sx follow up.

## 2019-04-17 NOTE — DISCHARGE NOTE NURSING/CASE MANAGEMENT/SOCIAL WORK - NSDCDPATPORTLINK_GEN_ALL_CORE
You can access the [facility] Patient Portal, offered by [facility], by registering with the following website: [email or web address]

## 2019-04-17 NOTE — DISCHARGE NOTE PROVIDER - HOSPITAL COURSE
Patient is a 63 year old female with a Hx asthma presents with 2 days of constant, sharp left lower quadrant abdominal pain. Patient reports chills nausea, and no vomiting.     Patient reports tolerating diet and most recent colonoscopy was 8 year ago noted to be normal. However she reports 15 years ago colonoscopy showed diverticulosis. No change in urinary symptoms.         Patient was admitted and placed on IV antibiotics with bowel rest. Once pain was noted to have GI function with resolution of pain, diet was introduced and advanced accordingly.     Colorectal was consulted with recommendations to continue PO antibiotics as outpatient for total of 14 days, and follow up within one week in Dr. Lucas's office. (Contact info provided, patient informed)        CT Scan perfromed: results noted below.     < from: CT Abdomen and Pelvis w/ Oral Cont and w/ IV Cont (04.13.19 @ 16:35) >    EXAM:  CT ABDOMEN AND PELVIS OC IC                          PROCEDURE DATE:  04/13/2019      INTERPRETATION:  CLINICAL INFORMATION: Left lower quadrant pain. Evaluate     for diverticulitis.    COMPARISON: None.    PROCEDURE:     CT of the Abdomen and Pelvis was performed with intravenous contrast.     Intravenous contrast: 90 ml Omnipaque 350. 10 ml discarded.    Oral contrast: positive contrast was administered.    Sagittal and coronal reformats were performed.    FINDINGS:    LOWER CHEST: Bibasilar atelectasis.    LIVER: Tiny left lobe hypodensity too small to characterize. Diffuse     hepatic steatosis.    BILE DUCTS: Common bile duct measuring approximately 1 cm.    GALLBLADDER: Within normal limits.    SPLEEN: Within normal limits.    PANCREAS: Within normal limits.    ADRENALS: Within normal limits.    KIDNEYS/URETERS: Right renal cyst. No hydronephrosis or renal calculi.     Suggestion of small left parapelvic cysts. Tiny/small hypodensities in     both kidneys which are too small to characterize    BLADDER: Layering relatively high density material within the urinary     bladder.     REPRODUCTIVE ORGANS: Fibroid uterus. The adnexa are within normal limits.    BOWEL: Inflammatory change with fat stranding and bowel wall thickening     of the sigmoid colon and distal descending colon diverticuli with focal     area of perforation (2:83). These inflammatory changes are noted adjacent     to the appendix, which appears normal. Colonic diverticulosis. No bowel     obstruction.    PERITONEUM: No ascites.    VESSELS: Atherosclerotic changes.    RETROPERITONEUM: No lymphadenopathy.      ABDOMINAL WALL: Lipoma overlying the right lumbar paraspinal muscles.    BONES: Degenerative changes.    IMPRESSION:     Acute perforated diverticulitis. Two segments of diverticulitis are     appreciated within the distal descending colon, the other in the proximal     sigmoid colon with a noninflamed.  Recommend colonoscopy once the     inflammation has subsided.    Common bile duct measuring approximately 1 cm, indeterminate.    Diffuse hepatic steatosis.    Layering relatively high density material within the urinary bladder.     Correlate with urinalysis.    < end of copied text > Patient is a 63 year old female with a Hx asthma presents with 2 days of constant, sharp left lower quadrant abdominal pain. Patient reports chills nausea, and no vomiting.     Patient reports tolerating diet and most recent colonoscopy was 8 year ago noted to be normal. However she reports 15 years ago colonoscopy showed diverticulosis. No change in urinary symptoms.         Patient was admitted and placed on IV antibiotics with bowel rest. Once pain was noted to have GI function with resolution of pain, diet was introduced and advanced accordingly.     Colorectal was consulted with recommendations to continue PO antibiotics as outpatient for total of 14 days, and follow up within one week in Dr. Lucas's office. (Contact info provided, patient informed)    White count normal on discharge, and patient noted to be afebrile with normal vitals.         CT Scan perfromed: results noted below.     < from: CT Abdomen and Pelvis w/ Oral Cont and w/ IV Cont (04.13.19 @ 16:35) >    EXAM:  CT ABDOMEN AND PELVIS OC IC                          PROCEDURE DATE:  04/13/2019      INTERPRETATION:  CLINICAL INFORMATION: Left lower quadrant pain. Evaluate     for diverticulitis.    COMPARISON: None.    PROCEDURE:     CT of the Abdomen and Pelvis was performed with intravenous contrast.     Intravenous contrast: 90 ml Omnipaque 350. 10 ml discarded.    Oral contrast: positive contrast was administered.    Sagittal and coronal reformats were performed.    FINDINGS:    LOWER CHEST: Bibasilar atelectasis.    LIVER: Tiny left lobe hypodensity too small to characterize. Diffuse     hepatic steatosis.    BILE DUCTS: Common bile duct measuring approximately 1 cm.    GALLBLADDER: Within normal limits.    SPLEEN: Within normal limits.    PANCREAS: Within normal limits.    ADRENALS: Within normal limits.    KIDNEYS/URETERS: Right renal cyst. No hydronephrosis or renal calculi.     Suggestion of small left parapelvic cysts. Tiny/small hypodensities in     both kidneys which are too small to characterize    BLADDER: Layering relatively high density material within the urinary     bladder.     REPRODUCTIVE ORGANS: Fibroid uterus. The adnexa are within normal limits.    BOWEL: Inflammatory change with fat stranding and bowel wall thickening     of the sigmoid colon and distal descending colon diverticuli with focal     area of perforation (2:83). These inflammatory changes are noted adjacent     to the appendix, which appears normal. Colonic diverticulosis. No bowel     obstruction.    PERITONEUM: No ascites.    VESSELS: Atherosclerotic changes.    RETROPERITONEUM: No lymphadenopathy.      ABDOMINAL WALL: Lipoma overlying the right lumbar paraspinal muscles.    BONES: Degenerative changes.    IMPRESSION:     Acute perforated diverticulitis. Two segments of diverticulitis are     appreciated within the distal descending colon, the other in the proximal     sigmoid colon with a noninflamed.  Recommend colonoscopy once the     inflammation has subsided.    Common bile duct measuring approximately 1 cm, indeterminate.    Diffuse hepatic steatosis.    Layering relatively high density material within the urinary bladder.     Correlate with urinalysis.    < end of copied text > Patient is a 63 year old female with a Hx of who asthma presents with 2 days of constant, sharp left lower quadrant abdominal pain. Patient reports chills nausea, and no vomiting.     Patient reports tolerating diet. Most recent colonoscopy was 8 year ago noted to be normal,  However she reports 15 years ago colonoscopy showed diverticulosis. No change in urinary symptoms.     Patient was admitted, and placed on IV antibiotics with bowel rest. Once GI function was noted and patient reported resolution of pain, diet was introduced and advanced accordingly.         Colorectal was consulted with recommendations to continue PO antibiotics as outpatient for total of 14 days, with follow up within one week in Dr. Lucas's office. (Contact info provided, & patient informed)    White count normal on discharge, and patient noted to be afebrile with normal vitals.         CT Scan perfromed: results noted below.     < from: CT Abdomen and Pelvis w/ Oral Cont and w/ IV Cont (04.13.19 @ 16:35) >    EXAM:  CT ABDOMEN AND PELVIS OC IC                          PROCEDURE DATE:  04/13/2019      INTERPRETATION:  CLINICAL INFORMATION: Left lower quadrant pain. Evaluate     for diverticulitis.    COMPARISON: None.    PROCEDURE:     CT of the Abdomen and Pelvis was performed with intravenous contrast.     Intravenous contrast: 90 ml Omnipaque 350. 10 ml discarded.    Oral contrast: positive contrast was administered.    Sagittal and coronal reformats were performed.    FINDINGS:    LOWER CHEST: Bibasilar atelectasis.    LIVER: Tiny left lobe hypodensity too small to characterize. Diffuse     hepatic steatosis.    BILE DUCTS: Common bile duct measuring approximately 1 cm.    GALLBLADDER: Within normal limits.    SPLEEN: Within normal limits.    PANCREAS: Within normal limits.    ADRENALS: Within normal limits.    KIDNEYS/URETERS: Right renal cyst. No hydronephrosis or renal calculi.     Suggestion of small left parapelvic cysts. Tiny/small hypodensities in     both kidneys which are too small to characterize    BLADDER: Layering relatively high density material within the urinary     bladder.     REPRODUCTIVE ORGANS: Fibroid uterus. The adnexa are within normal limits.    BOWEL: Inflammatory change with fat stranding and bowel wall thickening     of the sigmoid colon and distal descending colon diverticuli with focal     area of perforation (2:83). These inflammatory changes are noted adjacent     to the appendix, which appears normal. Colonic diverticulosis. No bowel     obstruction.    PERITONEUM: No ascites.    VESSELS: Atherosclerotic changes.    RETROPERITONEUM: No lymphadenopathy.      ABDOMINAL WALL: Lipoma overlying the right lumbar paraspinal muscles.    BONES: Degenerative changes.    IMPRESSION:     Acute perforated diverticulitis. Two segments of diverticulitis are     appreciated within the distal descending colon, the other in the proximal     sigmoid colon with a noninflamed.  Recommend colonoscopy once the     inflammation has subsided.    Common bile duct measuring approximately 1 cm, indeterminate.    Diffuse hepatic steatosis.    Layering relatively high density material within the urinary bladder.     Correlate with urinalysis.    < end of copied text >

## 2019-04-17 NOTE — DISCHARGE NOTE PROVIDER - PROVIDER TOKENS
PROVIDER:[TOKEN:[[id number]],FOLLOWUP:[1 week]],PROVIDER:[TOKEN:[[id number]],FOLLOWUP:[1 week]]

## 2019-04-17 NOTE — CONSULT NOTE ADULT - ASSESSMENT
· Assessment	  63F Hx asthma presents with sigmoid diverticulitis for 2 days.  Afebrile, WBC 13.7    - Zosyn IV for antibiosis  - LRD  - Pain control prn  - Abdominal exams  - F/u am labs

## 2019-04-17 NOTE — DISCHARGE NOTE PROVIDER - CARE PROVIDER_API CALL
Jose Lucas)  ColonRectal Surgery; Surgery  [address], [address]  [place] [address]  Phone: [phone]  Fax: [phone]  Follow Up Time: 1 week    Brayden Louie)  Surgery; Surgical Critical Care  [address] [place] [address]  Phone: [phone]  Fax: [phone]  Follow Up Time: 1 week Jose Lucas)  ColonRectal Surgery; Surgery  [address], [address]  [place] [address]  Phone: [phone]  Fax: [phone]  Follow Up Time: 1 week    Brayden Louie)  Surgery; Surgical Critical Care  [address] [place] [address]  Phone: [phone]  Fax: [phone]  Follow Up Time: 1 week

## 2019-04-17 NOTE — CONSULT NOTE ADULT - SUBJECTIVE AND OBJECTIVE BOX
Pt was seen and examined this morning. No acute distress. pain improved       Vital Signs Last 24 Hrs  T(C): 36.6 (17 Apr 2019 09:48), Max: 36.7 (16 Apr 2019 17:00)  T(F): 97.8 (17 Apr 2019 09:48), Max: 98.1 (16 Apr 2019 17:00)  HR: 67 (17 Apr 2019 09:48) (67 - 78)  BP: 135/74 (17 Apr 2019 09:48) (126/76 - 156/76)  BP(mean): --  RR: 18 (17 Apr 2019 09:48) (17 - 18)  SpO2: 94% (17 Apr 2019 09:48) (94% - 96%)    I&O's Detail    16 Apr 2019 07:01  -  17 Apr 2019 07:00  --------------------------------------------------------  IN:    Oral Fluid: 1360 mL    Solution: 300 mL  Total IN: 1660 mL    OUT:    Voided: 2050 mL  Total OUT: 2050 mL    Total NET: -390 mL      17 Apr 2019 07:01  -  17 Apr 2019 10:01  --------------------------------------------------------  IN:    Oral Fluid: 360 mL  Total IN: 360 mL    OUT:    Voided: 200 mL  Total OUT: 200 mL    Total NET: 160 mL          MEDICATIONS  (STANDING):  docusate sodium 100 milliGRAM(s) Oral daily  enoxaparin Injectable 40 milliGRAM(s) SubCutaneous daily  piperacillin/tazobactam IVPB. 3.375 Gram(s) IV Intermittent every 8 hours  senna 2 Tablet(s) Oral at bedtime    MEDICATIONS  (PRN):  ondansetron Injectable 4 milliGRAM(s) IV Push every 6 hours PRN Nausea and/or Vomiting      LABS:                        11.6   7.02  )-----------( 265      ( 16 Apr 2019 09:33 )             36.7     04-17    140  |  102  |  13  ----------------------------<  95  4.3   |  24  |  1.00    Ca    9.7      17 Apr 2019 06:44  Phos  4.5     04-17  Mg     2.1     04-17      Physical exam   no acute distress  Resp: non labored breathing   abd. soft, RLQ tenderness

## 2019-04-17 NOTE — CONSULT NOTE ADULT - SUBJECTIVE AND OBJECTIVE BOX
Patient is a 63y old  Female who presents with a chief complaint of Diverticulitis (17 Apr 2019 08:00)      HPI:  The patient was admitted with a first episode of diverticulitis that has been successfully treated with antibiotics. The patient reports no pain today and is laura LRD. Her last colonoscopy was 8-10 years ago that she states was unremarkable.       PAST MEDICAL & SURGICAL HISTORY:  Asthma, unspecified asthma severity, unspecified whether complicated, unspecified whether persistent  Bronchitis  JEFFRY (obstructive sleep apnea)  No significant past surgical history      REVIEW OF SYSTEMS    General:	Negative  Skin/Breast: Negative	  Ophthalmologic: Negative  ENMT: Negative  Respiratory and Thorax: Negative  Cardiovascular: Negative  Gastrointestinal: Negative  Genitourinary: Negative  Musculoskeletal: Negative  Neurological: Negative  Psychiatric: Negative  Hematology/Lymphatics: Negative  Endocrine:	Negative  Allergic/Immunologic:	 Negative      MEDICATIONS  (STANDING):  docusate sodium 100 milliGRAM(s) Oral daily  enoxaparin Injectable 40 milliGRAM(s) SubCutaneous daily  piperacillin/tazobactam IVPB. 3.375 Gram(s) IV Intermittent every 8 hours  senna 2 Tablet(s) Oral at bedtime    MEDICATIONS  (PRN):  ondansetron Injectable 4 milliGRAM(s) IV Push every 6 hours PRN Nausea and/or Vomiting      Allergies    ammonia, peroxide, hair dye (Rash)  No Known Drug Allergies  shellfish (Short breath; Hives)    Intolerances        SOCIAL HISTORY:  Alcohol: Denied  Smoking: Nonsmoker  Drug Use: Denied  Marital Status:         FAMILY HISTORY:  FH: heart disease: Mom and dad      Vital Signs Last 24 Hrs  T(C): 36.6 (17 Apr 2019 09:48), Max: 36.7 (16 Apr 2019 17:00)  T(F): 97.8 (17 Apr 2019 09:48), Max: 98.1 (16 Apr 2019 17:00)  HR: 67 (17 Apr 2019 09:48) (67 - 78)  BP: 135/74 (17 Apr 2019 09:48) (126/76 - 156/76)  BP(mean): --  RR: 18 (17 Apr 2019 09:48) (17 - 18)  SpO2: 94% (17 Apr 2019 09:48) (94% - 96%)    PHYSICAL EXAM:  Constitutional: well developed, well nourished, NAD  Eyes: anicteric  ENMT: normal facies, symmetric  Neck: supple  Respiratory: CTA bilat  Cardiovascular: RRR  Gastrointestinal: abdomen soft, nontender, nondistended. No obvious masses. No peritonitis  Extremities: FROM, warm  Neurological: intact, non-focal  Skin: no gross lesions  Lymph Nodes: no gross adenopathy  Musculoskeletal: equal strength bilateral upper and lower extremities  Psychiatric: oriented x 3; appropriate          LABS:                        11.6   7.02  )-----------( 265      ( 16 Apr 2019 09:33 )             36.7     04-17    140  |  102  |  13  ----------------------------<  95  4.3   |  24  |  1.00    Ca    9.7      17 Apr 2019 06:44  Phos  4.5     04-17  Mg     2.1     04-17            RADIOLOGY & ADDITIONAL STUDIES:    CT with 2 areas of inflammation in the descending and sigmoid colon with a focus of extraluminal air. No free air or fluid.

## 2019-04-17 NOTE — CONSULT NOTE ADULT - ATTENDING COMMENTS
Patient seen and examined on AM rounds  Doing well  Tolerating PO  Pain with very good control    - Will d/c home with oral antibiotics and follow up with colorectal surgery as outpatient

## 2019-04-26 ENCOUNTER — APPOINTMENT (OUTPATIENT)
Dept: COLORECTAL SURGERY | Facility: CLINIC | Age: 64
End: 2019-04-26
Payer: COMMERCIAL

## 2019-04-26 VITALS
OXYGEN SATURATION: 95 % | DIASTOLIC BLOOD PRESSURE: 95 MMHG | WEIGHT: 228 LBS | BODY MASS INDEX: 40.4 KG/M2 | RESPIRATION RATE: 16 BRPM | HEIGHT: 63 IN | SYSTOLIC BLOOD PRESSURE: 153 MMHG | HEART RATE: 70 BPM

## 2019-04-26 DIAGNOSIS — Z87.891 PERSONAL HISTORY OF NICOTINE DEPENDENCE: ICD-10-CM

## 2019-04-26 DIAGNOSIS — Z86.19 PERSONAL HISTORY OF OTHER INFECTIOUS AND PARASITIC DISEASES: ICD-10-CM

## 2019-04-26 DIAGNOSIS — Z80.9 FAMILY HISTORY OF MALIGNANT NEOPLASM, UNSPECIFIED: ICD-10-CM

## 2019-04-26 DIAGNOSIS — R93.5 ABNORMAL FINDINGS ON DIAGNOSTIC IMAGING OF OTHER ABDOMINAL REGIONS, INCLUDING RETROPERITONEUM: ICD-10-CM

## 2019-04-26 PROCEDURE — 99213 OFFICE O/P EST LOW 20 MIN: CPT

## 2019-04-26 RX ORDER — FLUCONAZOLE 200 MG/1
200 TABLET ORAL
Qty: 1 | Refills: 0 | Status: ACTIVE | COMMUNITY
Start: 2019-04-26 | End: 1900-01-01

## 2019-04-26 RX ORDER — AMOXICILLIN AND CLAVULANATE POTASSIUM 875; 125 MG/1; MG/1
875-125 TABLET, COATED ORAL
Refills: 0 | Status: ACTIVE | COMMUNITY

## 2019-04-26 NOTE — HISTORY OF PRESENT ILLNESS
[FreeTextEntry1] : The patient presents after recent hospitalization for a first episode of Hinchey 2 diverticulitis with a microperforation with extraluminal air. No drainable abscess. She recovered quickly with IV abx and now feels well without pain. She has one more day of PO augmentin. She has some vaginal itching and discomfort.

## 2019-04-26 NOTE — PHYSICAL EXAM
[Wheezing] : no wheezing was heard [Normal Breath Sounds] : Normal breath sounds [Normal Heart Sounds] : normal heart sounds [Normal Rate and Rhythm] : normal rate and rhythm [Oriented to Person] : oriented to person [Alert] : alert [Oriented to Place] : oriented to place [Oriented to Time] : oriented to time [Calm] : calm [de-identified] : obese, soft, NT/ND [de-identified] : NCAT [de-identified] : NAD [de-identified] : normal ROM [de-identified] : supple

## 2019-04-26 NOTE — ASSESSMENT
[FreeTextEntry1] : The patient's last colonoscopy was in 2007 so she was offered a surveillance colonoscopy in 6-8 weeks from now. The R/B/A were d/w her including but not limited to bleeding, perforation and missed lesions. Miralax prep given. \par She was then offered an elective robotic/laparoscopic, possible open sigmoid resection with intraop ureteral catheters if available. We discussed the possibility of an ostomy. The R/B/A were d/w her including but not limited to pain, bleeding, infection, anastomotic leak, postop ileus, wound complications, MI, stroke, DVT, PE, and death. She would like to wait until after the colonoscopy to decide if she wants to proceed with the surgery. 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 \par She will obtain medical clearance from her PCP if needed.\par \par She also was noted to have a CBD of 1cm on CT with a very mildly elevated total bili and direct bili (just above the normal range). She had f/u bloodwork with her PCP since d/c that is not available to me at this time. She will f/u with him for those results. Will order RUQ u/s to eval for GB dz

## 2019-05-11 ENCOUNTER — APPOINTMENT (OUTPATIENT)
Dept: ULTRASOUND IMAGING | Facility: IMAGING CENTER | Age: 64
End: 2019-05-11
Payer: COMMERCIAL

## 2019-05-11 ENCOUNTER — OUTPATIENT (OUTPATIENT)
Dept: OUTPATIENT SERVICES | Facility: HOSPITAL | Age: 64
LOS: 1 days | End: 2019-05-11
Payer: COMMERCIAL

## 2019-05-11 DIAGNOSIS — R93.5 ABNORMAL FINDINGS ON DIAGNOSTIC IMAGING OF OTHER ABDOMINAL REGIONS, INCLUDING RETROPERITONEUM: ICD-10-CM

## 2019-05-11 PROCEDURE — 76705 ECHO EXAM OF ABDOMEN: CPT

## 2019-05-11 PROCEDURE — 76705 ECHO EXAM OF ABDOMEN: CPT | Mod: 26

## 2019-06-27 ENCOUNTER — APPOINTMENT (OUTPATIENT)
Dept: COLORECTAL SURGERY | Facility: CLINIC | Age: 64
End: 2019-06-27
Payer: COMMERCIAL

## 2019-06-27 DIAGNOSIS — K57.32 DIVERTICULITIS OF LARGE INTESTINE W/OUT PERFORATION OR ABSCESS W/OUT BLEEDING: ICD-10-CM

## 2019-06-27 DIAGNOSIS — K57.90 DIVERTICULOSIS OF INTESTINE, PART UNSPECIFIED, W/OUT PERFORATION OR ABSCESS W/OUT BLEEDING: ICD-10-CM

## 2019-06-27 DIAGNOSIS — K63.5 POLYP OF COLON: ICD-10-CM

## 2019-06-27 PROCEDURE — 99213 OFFICE O/P EST LOW 20 MIN: CPT | Mod: 25

## 2019-06-27 PROCEDURE — 45378 DIAGNOSTIC COLONOSCOPY: CPT

## 2019-06-27 RX ORDER — NEOMYCIN SULFATE 500 MG/1
500 TABLET ORAL
Qty: 6 | Refills: 0 | Status: ACTIVE | COMMUNITY
Start: 2019-06-27 | End: 1900-01-01

## 2019-06-27 RX ORDER — METRONIDAZOLE 500 MG/1
500 TABLET ORAL
Qty: 3 | Refills: 0 | Status: ACTIVE | COMMUNITY
Start: 2019-06-27 | End: 1900-01-01

## 2019-07-29 ENCOUNTER — APPOINTMENT (OUTPATIENT)
Dept: COLORECTAL SURGERY | Facility: HOSPITAL | Age: 64
End: 2019-07-29

## 2020-01-06 NOTE — PROGRESS NOTE ADULT - PROBLEM/PLAN-3
DISPLAY PLAN FREE TEXT Cimzia Counseling:  I discussed with the patient the risks of Cimzia including but not limited to immunosuppression, allergic reactions and infections.  The patient understands that monitoring is required including a PPD at baseline and must alert us or the primary physician if symptoms of infection or other concerning signs are noted.

## 2020-12-21 PROBLEM — Z86.19 HISTORY OF CANDIDIASIS OF VAGINA: Status: RESOLVED | Noted: 2019-04-26 | Resolved: 2020-12-21

## 2022-03-05 ENCOUNTER — EMERGENCY (EMERGENCY)
Facility: HOSPITAL | Age: 67
LOS: 1 days | Discharge: HOME | End: 2022-03-05
Attending: EMERGENCY MEDICINE | Admitting: EMERGENCY MEDICINE
Payer: COMMERCIAL

## 2022-03-05 VITALS
TEMPERATURE: 98 F | RESPIRATION RATE: 19 BRPM | OXYGEN SATURATION: 98 % | DIASTOLIC BLOOD PRESSURE: 91 MMHG | HEART RATE: 60 BPM | SYSTOLIC BLOOD PRESSURE: 156 MMHG

## 2022-03-05 VITALS
OXYGEN SATURATION: 97 % | TEMPERATURE: 98 F | HEART RATE: 64 BPM | DIASTOLIC BLOOD PRESSURE: 82 MMHG | HEIGHT: 63 IN | RESPIRATION RATE: 18 BRPM | SYSTOLIC BLOOD PRESSURE: 156 MMHG | WEIGHT: 205.91 LBS

## 2022-03-05 LAB
ALBUMIN SERPL ELPH-MCNC: 3 G/DL — LOW (ref 3.3–5)
ALP SERPL-CCNC: 76 U/L — SIGNIFICANT CHANGE UP (ref 30–120)
ALT FLD-CCNC: 34 U/L DA — SIGNIFICANT CHANGE UP (ref 10–60)
ANION GAP SERPL CALC-SCNC: 6 MMOL/L — SIGNIFICANT CHANGE UP (ref 5–17)
APPEARANCE UR: CLEAR — SIGNIFICANT CHANGE UP
APTT BLD: 29.6 SEC — SIGNIFICANT CHANGE UP (ref 27.5–35.5)
AST SERPL-CCNC: 18 U/L — SIGNIFICANT CHANGE UP (ref 10–40)
BASOPHILS # BLD AUTO: 0 K/UL — SIGNIFICANT CHANGE UP (ref 0–0.2)
BASOPHILS NFR BLD AUTO: 0 % — SIGNIFICANT CHANGE UP (ref 0–2)
BILIRUB SERPL-MCNC: 0.9 MG/DL — SIGNIFICANT CHANGE UP (ref 0.2–1.2)
BILIRUB UR-MCNC: NEGATIVE — SIGNIFICANT CHANGE UP
BUN SERPL-MCNC: 11 MG/DL — SIGNIFICANT CHANGE UP (ref 7–23)
CALCIUM SERPL-MCNC: 8.3 MG/DL — LOW (ref 8.4–10.5)
CHLORIDE SERPL-SCNC: 105 MMOL/L — SIGNIFICANT CHANGE UP (ref 96–108)
CO2 SERPL-SCNC: 28 MMOL/L — SIGNIFICANT CHANGE UP (ref 22–31)
COLOR SPEC: YELLOW — SIGNIFICANT CHANGE UP
CREAT SERPL-MCNC: 0.76 MG/DL — SIGNIFICANT CHANGE UP (ref 0.5–1.3)
DIFF PNL FLD: NEGATIVE — SIGNIFICANT CHANGE UP
EGFR: 87 ML/MIN/1.73M2 — SIGNIFICANT CHANGE UP
EOSINOPHIL # BLD AUTO: 0.09 K/UL — SIGNIFICANT CHANGE UP (ref 0–0.5)
EOSINOPHIL NFR BLD AUTO: 2 % — SIGNIFICANT CHANGE UP (ref 0–6)
GLUCOSE SERPL-MCNC: 101 MG/DL — HIGH (ref 70–99)
GLUCOSE UR QL: NEGATIVE MG/DL — SIGNIFICANT CHANGE UP
HCT VFR BLD CALC: 39.9 % — SIGNIFICANT CHANGE UP (ref 34.5–45)
HGB BLD-MCNC: 12.6 G/DL — SIGNIFICANT CHANGE UP (ref 11.5–15.5)
INR BLD: 1.07 RATIO — SIGNIFICANT CHANGE UP (ref 0.88–1.16)
KETONES UR-MCNC: NEGATIVE — SIGNIFICANT CHANGE UP
LEUKOCYTE ESTERASE UR-ACNC: NEGATIVE — SIGNIFICANT CHANGE UP
LIDOCAIN IGE QN: 84 U/L — SIGNIFICANT CHANGE UP (ref 73–393)
LYMPHOCYTES # BLD AUTO: 1.67 K/UL — SIGNIFICANT CHANGE UP (ref 1–3.3)
LYMPHOCYTES # BLD AUTO: 36 % — SIGNIFICANT CHANGE UP (ref 13–44)
MAGNESIUM SERPL-MCNC: 2.2 MG/DL — SIGNIFICANT CHANGE UP (ref 1.6–2.6)
MANUAL SMEAR VERIFICATION: SIGNIFICANT CHANGE UP
MCHC RBC-ENTMCNC: 27.3 PG — SIGNIFICANT CHANGE UP (ref 27–34)
MCHC RBC-ENTMCNC: 31.6 GM/DL — LOW (ref 32–36)
MCV RBC AUTO: 86.4 FL — SIGNIFICANT CHANGE UP (ref 80–100)
MONOCYTES # BLD AUTO: 0.14 K/UL — SIGNIFICANT CHANGE UP (ref 0–0.9)
MONOCYTES NFR BLD AUTO: 3 % — SIGNIFICANT CHANGE UP (ref 2–14)
NEUTROPHILS # BLD AUTO: 2.55 K/UL — SIGNIFICANT CHANGE UP (ref 1.8–7.4)
NEUTROPHILS NFR BLD AUTO: 48 % — SIGNIFICANT CHANGE UP (ref 43–77)
NEUTS BAND # BLD: 7 % — SIGNIFICANT CHANGE UP (ref 0–8)
NITRITE UR-MCNC: NEGATIVE — SIGNIFICANT CHANGE UP
NRBC # BLD: 0 — SIGNIFICANT CHANGE UP
NRBC # BLD: SIGNIFICANT CHANGE UP /100 WBCS (ref 0–0)
PH UR: 5 — SIGNIFICANT CHANGE UP (ref 5–8)
PHOSPHATE SERPL-MCNC: 3.5 MG/DL — SIGNIFICANT CHANGE UP (ref 2.5–4.5)
PLAT MORPH BLD: NORMAL — SIGNIFICANT CHANGE UP
PLATELET # BLD AUTO: 234 K/UL — SIGNIFICANT CHANGE UP (ref 150–400)
POTASSIUM SERPL-MCNC: 3.7 MMOL/L — SIGNIFICANT CHANGE UP (ref 3.5–5.3)
POTASSIUM SERPL-SCNC: 3.7 MMOL/L — SIGNIFICANT CHANGE UP (ref 3.5–5.3)
PROT SERPL-MCNC: 7.2 G/DL — SIGNIFICANT CHANGE UP (ref 6–8.3)
PROT UR-MCNC: NEGATIVE MG/DL — SIGNIFICANT CHANGE UP
PROTHROM AB SERPL-ACNC: 12.3 SEC — SIGNIFICANT CHANGE UP (ref 10.5–13.4)
RBC # BLD: 4.62 M/UL — SIGNIFICANT CHANGE UP (ref 3.8–5.2)
RBC # FLD: 14.5 % — SIGNIFICANT CHANGE UP (ref 10.3–14.5)
RBC BLD AUTO: NORMAL — SIGNIFICANT CHANGE UP
SARS-COV-2 RNA SPEC QL NAA+PROBE: SIGNIFICANT CHANGE UP
SODIUM SERPL-SCNC: 139 MMOL/L — SIGNIFICANT CHANGE UP (ref 135–145)
SP GR SPEC: 1.01 — SIGNIFICANT CHANGE UP (ref 1.01–1.02)
TROPONIN I, HIGH SENSITIVITY RESULT: 14.3 NG/L — SIGNIFICANT CHANGE UP
UROBILINOGEN FLD QL: NEGATIVE MG/DL — SIGNIFICANT CHANGE UP
VARIANT LYMPHS # BLD: 4 % — SIGNIFICANT CHANGE UP (ref 0–6)
WBC # BLD: 4.63 K/UL — SIGNIFICANT CHANGE UP (ref 3.8–10.5)
WBC # FLD AUTO: 4.63 K/UL — SIGNIFICANT CHANGE UP (ref 3.8–10.5)

## 2022-03-05 PROCEDURE — 70450 CT HEAD/BRAIN W/O DYE: CPT | Mod: MA

## 2022-03-05 PROCEDURE — 84484 ASSAY OF TROPONIN QUANT: CPT

## 2022-03-05 PROCEDURE — 96374 THER/PROPH/DIAG INJ IV PUSH: CPT

## 2022-03-05 PROCEDURE — 99285 EMERGENCY DEPT VISIT HI MDM: CPT | Mod: 25

## 2022-03-05 PROCEDURE — 85610 PROTHROMBIN TIME: CPT

## 2022-03-05 PROCEDURE — 83735 ASSAY OF MAGNESIUM: CPT

## 2022-03-05 PROCEDURE — 84100 ASSAY OF PHOSPHORUS: CPT

## 2022-03-05 PROCEDURE — 93010 ELECTROCARDIOGRAM REPORT: CPT

## 2022-03-05 PROCEDURE — 81003 URINALYSIS AUTO W/O SCOPE: CPT

## 2022-03-05 PROCEDURE — 85730 THROMBOPLASTIN TIME PARTIAL: CPT

## 2022-03-05 PROCEDURE — 85025 COMPLETE CBC W/AUTO DIFF WBC: CPT

## 2022-03-05 PROCEDURE — 71045 X-RAY EXAM CHEST 1 VIEW: CPT

## 2022-03-05 PROCEDURE — 87635 SARS-COV-2 COVID-19 AMP PRB: CPT

## 2022-03-05 PROCEDURE — 99285 EMERGENCY DEPT VISIT HI MDM: CPT

## 2022-03-05 PROCEDURE — 83690 ASSAY OF LIPASE: CPT

## 2022-03-05 PROCEDURE — 36415 COLL VENOUS BLD VENIPUNCTURE: CPT

## 2022-03-05 PROCEDURE — 93005 ELECTROCARDIOGRAM TRACING: CPT

## 2022-03-05 PROCEDURE — 80053 COMPREHEN METABOLIC PANEL: CPT

## 2022-03-05 PROCEDURE — 71045 X-RAY EXAM CHEST 1 VIEW: CPT | Mod: 26

## 2022-03-05 PROCEDURE — 70450 CT HEAD/BRAIN W/O DYE: CPT | Mod: 26,MA

## 2022-03-05 RX ORDER — ONDANSETRON 8 MG/1
4 TABLET, FILM COATED ORAL ONCE
Refills: 0 | Status: COMPLETED | OUTPATIENT
Start: 2022-03-05 | End: 2022-03-05

## 2022-03-05 RX ORDER — SODIUM CHLORIDE 9 MG/ML
1000 INJECTION INTRAMUSCULAR; INTRAVENOUS; SUBCUTANEOUS ONCE
Refills: 0 | Status: COMPLETED | OUTPATIENT
Start: 2022-03-05 | End: 2022-03-05

## 2022-03-05 RX ORDER — ALBUTEROL 90 UG/1
2 AEROSOL, METERED ORAL
Qty: 0 | Refills: 0 | DISCHARGE

## 2022-03-05 RX ORDER — MECLIZINE HCL 12.5 MG
1 TABLET ORAL
Qty: 15 | Refills: 0
Start: 2022-03-05

## 2022-03-05 RX ORDER — ASPIRIN/CALCIUM CARB/MAGNESIUM 324 MG
1 TABLET ORAL
Qty: 0 | Refills: 0 | DISCHARGE

## 2022-03-05 RX ORDER — MECLIZINE HCL 12.5 MG
25 TABLET ORAL ONCE
Refills: 0 | Status: COMPLETED | OUTPATIENT
Start: 2022-03-05 | End: 2022-03-05

## 2022-03-05 RX ADMIN — ONDANSETRON 4 MILLIGRAM(S): 8 TABLET, FILM COATED ORAL at 09:48

## 2022-03-05 RX ADMIN — Medication 25 MILLIGRAM(S): at 09:48

## 2022-03-05 RX ADMIN — SODIUM CHLORIDE 1000 MILLILITER(S): 9 INJECTION INTRAMUSCULAR; INTRAVENOUS; SUBCUTANEOUS at 09:48

## 2022-03-05 NOTE — ED PROVIDER NOTE - CLINICAL SUMMARY MEDICAL DECISION MAKING FREE TEXT BOX
66 f with dizziness since yesterday. Recent GI virus. Likely Labyrinthitis. Plan - labs, ekg, CT, Meclizine.

## 2022-03-05 NOTE — ED ADULT TRIAGE NOTE - CHIEF COMPLAINT QUOTE
abd pain and nausea and room spinning since yesterday had diarrhea 2 days ago but resolved with brat diet

## 2022-03-05 NOTE — ED PROVIDER NOTE - CARE PROVIDER_API CALL
Armand Baker  INTERNAL MEDICINE  [address], [address]  [place] [address]  Phone: [phone]  Fax: [phone]  Established Patient  Follow Up Time: Urgent

## 2022-03-05 NOTE — ED PROVIDER NOTE - PATIENT PORTAL LINK FT
You can access the FollowMyHealth Patient Portal offered by [facility] by registering at the following website: [email or web address]. By joining [employer]’s FollowMyHealth portal, you will also be able to view your health information using other applications (apps) compatible with our system.

## 2022-03-05 NOTE — ED PROVIDER NOTE - OBJECTIVE STATEMENT
67 yo F with no PMHx co dizziness NV since yesterday. Had stomach virus earlier in the week with watery diarrhea which is now gone. Denies fever, headache, visual disturbance, CP, abd pain, melena, dysuria or other symptom or problem.

## 2022-03-05 NOTE — ED PROVIDER NOTE - PROGRESS NOTE DETAILS
Feels better. Tolerating PO. Reevaluated patient at bedside.  Patient feeling much improved.  Discussed the results of all diagnostic testing in ED and copies of all reports given.   An opportunity to ask questions was given.  Discussed the importance of prompt, close medical follow-up.  Patient will return with any changes, concerns or persistent / worsening symptoms.  Understanding of all instructions verbalized.

## 2022-03-05 NOTE — ED ADULT NURSE NOTE - OBJECTIVE STATEMENT
67 y/o female received aox4 ambulatory c/o dizziness and lightheadedness since yesterday. pt reports she feels as if the room is spinning around her. associated with nausea, denies vomiting. also reports abd pain.

## 2022-03-05 NOTE — ED PROVIDER NOTE - NSFOLLOWUPINSTRUCTIONS_ED_ALL_ED_FT
Labyrinthitis    WHAT YOU NEED TO KNOW:    Labyrinthitis is an inflammation of the labyrinth or nerves in the inner ear. The labyrinth helps you hear and keep your balance. Symptoms can make it difficult to walk or do your normal activities but are not life-threatening.    DISCHARGE INSTRUCTIONS:    Call your local emergency number (911 in the [place]) or have someone call if:   •You have trouble speaking or thinking clearly.      •You have vision changes or shortness of breath.      Return to the emergency department if:   •You are not able to keep any fluids, food, or medicine down without vomiting.      •You have a dry mouth or cracked lips.      •You have a headache, stiff neck, and a fever.      •Your heartbeat is fast or irregular.      •You are not able to urinate.      •You have blood, pus, or fluid coming out of your ears.      •You have dizzy spells that last longer than they usually do.      •You have any weak or numb areas.      Call your doctor if:   •You have a fever.      •You have ear pain.      •You feel weak, tired, or lose weight without trying.      •Your symptoms keep coming back or get worse.      •You have questions or concerns about your condition or care.      Medicines: You may need any of the following:  •Antivertigo medicine helps you feel less dizzy.      •Antinausea medicine helps calm your stomach and prevents vomiting.      •Antiviral medicine treats a viral infection.      •Antibiotics treat a bacterial infection.      •Steroids decrease inflammation in the inner ear.      •Take your medicine as directed. Contact your healthcare provider if you think your medicine is not helping or if you have side effects. Tell him of her if you are allergic to any medicine. Keep a list of the medicines, vitamins, and herbs you take. Include the amounts, and when and why you take them. Bring the list or the pill bottles to follow-up visits. Carry your medicine list with you in case of an emergency.      Manage labyrinthitis:   •Do the following when you have signs and symptoms of labyrinthitis: ?Be calm and take slow, deep breaths.      ?Sit or lie down right away when you feel dizzy.      ?Keep your head as still as possible and do not change positions quickly. Move slowly and let yourself get used to one position before moving to another position.      ?Do not walk without help, drive a car, or operate heavy machinery when you feel dizzy.      •Make your home safe to prevent falls. Use a 4-pronged cane or walker to help you keep your balance when you walk. Remove loose carpeting from the floor to reduce your risk for a fall. Use chairs with side arms and hard cushions to make it easier to get up or out of a chair. Put grab bars on the walls beside toilets and inside showers and bathtubs. These will help you get up and help prevent falls. You may want to put a shower chair inside the shower.  Fall Prevention for Seniors           •Use vestibular and balance rehabilitation therapy (VBRT), if directed. Therapy may be done with a physical therapist or at home. VBRT includes movement exercises while you are sitting or standing. These exercises will make you dizzy, but can also help your brain adapt to the triggers that are causing your vertigo. Over time, this therapy may help you have vertigo less often and also improve your balance.      For more information:   •[employer]   [address] [address]  [place], [place]  Phone: [phone]  Phone: [phone]        Follow up with your doctor as directed: Write down your questions so you remember to ask them during your visits.

## 2023-01-03 NOTE — ED PROVIDER NOTE - MDM PATIENT STATEMENT FOR ADDL TREATMENT
Patient with one or more new problems requiring additional work-up/treatment.
MSK XR negative - No fracture, No dislocation, No foreign body

## 2023-12-09 NOTE — ED PROVIDER NOTE - IV ALTEPLASE INCLUSION HIDDEN
Regarding: COVID; BREASTFEEDING ADVICE  ----- Message from Kaity Gonsalez sent at 12/8/2023  8:09 PM CST -----  Patient Name: Mulu Leonard    Specialist or PCP Name: Jessica Adam MD    Symptoms: patient has covid and would like to know how can she feed her breastfeed baby; baby is one week     Pregnant (females aged 13-60. If Yes, how long?) : N/A    Call Back # :  [phone]     Which State are you currently located in?: [place]     Name of Clinic Site / Acct# : [facility]     Use following scripting for patients waiting for a callback:   \"Nurse callback times vary based on call volumes; please be aware the return phone call may come from an unidentified or out of state phone number. If your symptoms worsen or become life threatening while waiting, you should seek immediate assistance by calling 911 or going to the ER for evaluation.\"     show

## 2024-05-24 NOTE — DIETITIAN INITIAL EVALUATION ADULT. - NUTRITIONGOAL OUTCOME1
[FreeTextEntry6] : -sore throat x 3 days and fevers 101-102 for the past two days Fever persists today 100.7 No headache or stomache ache. Runny nose or stuffy nose  Pt to meet >75% of estimated needs via tolerated route

## 2024-07-24 NOTE — ED PROVIDER NOTE - CROS ED PSYCH ALL NEG
[FreeTextEntry1] : Patient provided Cath'ed specimen. Catheter inserted without difficulty. Clear yellow urine return. 80ml of urine drained. Urine specimen obtained. Catheter removed intact. Patient tolerated well.  UTI avoidance strategies reviewed:  Hydrate to >2.7 L or more daily manage and maintain normal bowels Consider daily probiotic Spoke of what is known about [place] In post menopausal women, should strongly consider topical or intravaginal estrogen therapy, if safe.   Renewal estradiol cream topically x 1 mo and then 2x'/wk  Catheterized Urine will be sent for UA w Microscopic, and Urine culture.   Patient will be called with results.   negative...

## 2024-11-06 ENCOUNTER — APPOINTMENT (OUTPATIENT)
Dept: GASTROENTEROLOGY | Facility: CLINIC | Age: 69
End: 2024-11-06

## 2024-11-21 NOTE — ED PROVIDER NOTE - CCCP TRG CHIEF CMPLNT
Quality 130: Documentation Of Current Medications In The Medical Record: Current Medications Documented Detail Level: Detailed abdominal pain

## 2024-12-05 ENCOUNTER — APPOINTMENT (OUTPATIENT)
Dept: GASTROENTEROLOGY | Facility: CLINIC | Age: 69
End: 2024-12-05
Payer: COMMERCIAL

## 2024-12-05 VITALS — TEMPERATURE: 98.1 F

## 2024-12-05 VITALS
OXYGEN SATURATION: 94 % | BODY MASS INDEX: 42.33 KG/M2 | WEIGHT: 230 LBS | SYSTOLIC BLOOD PRESSURE: 159 MMHG | HEART RATE: 77 BPM | DIASTOLIC BLOOD PRESSURE: 84 MMHG | HEIGHT: 62 IN | RESPIRATION RATE: 16 BRPM

## 2024-12-05 DIAGNOSIS — K63.5 POLYP OF COLON: ICD-10-CM

## 2024-12-05 DIAGNOSIS — I25.10 ATHEROSCLEROTIC HEART DISEASE OF NATIVE CORONARY ARTERY W/OUT ANGINA PECTORIS: ICD-10-CM

## 2024-12-05 DIAGNOSIS — R10.9 UNSPECIFIED ABDOMINAL PAIN: ICD-10-CM

## 2024-12-05 DIAGNOSIS — Z12.11 ENCOUNTER FOR SCREENING FOR MALIGNANT NEOPLASM OF COLON: ICD-10-CM

## 2024-12-05 DIAGNOSIS — K57.30 DIVERTICULOSIS OF LARGE INTESTINE W/OUT PERFORATION OR ABSCESS W/OUT BLEEDING: ICD-10-CM

## 2024-12-05 DIAGNOSIS — K21.9 GASTRO-ESOPHAGEAL REFLUX DISEASE W/OUT ESOPHAGITIS: ICD-10-CM

## 2024-12-05 PROCEDURE — 99204 OFFICE O/P NEW MOD 45 MIN: CPT

## 2024-12-05 RX ORDER — SODIUM SULFATE, POTASSIUM SULFATE AND MAGNESIUM SULFATE 1.6; 3.13; 17.5 G/177ML; G/177ML; G/177ML
17.5-3.13-1.6 SOLUTION ORAL
Qty: 2 | Refills: 0 | Status: ACTIVE | COMMUNITY
Start: 2024-12-05 | End: 1900-01-01

## 2024-12-05 RX ORDER — ASPIRIN 81 MG
81 TABLET, DELAYED RELEASE (ENTERIC COATED) ORAL
Refills: 0 | Status: ACTIVE | COMMUNITY

## 2024-12-05 RX ORDER — ATORVASTATIN CALCIUM 20 MG/1
20 TABLET, FILM COATED ORAL
Refills: 0 | Status: ACTIVE | COMMUNITY

## 2024-12-23 ENCOUNTER — APPOINTMENT (OUTPATIENT)
Dept: GASTROENTEROLOGY | Facility: AMBULATORY MEDICAL SERVICES | Age: 69
End: 2024-12-23
Payer: COMMERCIAL

## 2024-12-23 PROCEDURE — 45380 COLONOSCOPY AND BIOPSY: CPT | Mod: 33

## 2024-12-23 PROCEDURE — 43239 EGD BIOPSY SINGLE/MULTIPLE: CPT | Mod: 59

## 2024-12-30 RX ORDER — DICYCLOMINE HYDROCHLORIDE 10 MG/1
10 CAPSULE ORAL EVERY 6 HOURS
Qty: 120 | Refills: 2 | Status: ACTIVE | COMMUNITY
Start: 2024-12-30 | End: 1900-01-01

## 2025-02-05 ENCOUNTER — APPOINTMENT (OUTPATIENT)
Dept: GASTROENTEROLOGY | Facility: CLINIC | Age: 70
End: 2025-02-05
Payer: COMMERCIAL

## 2025-02-05 VITALS
SYSTOLIC BLOOD PRESSURE: 153 MMHG | HEART RATE: 75 BPM | BODY MASS INDEX: 42.69 KG/M2 | DIASTOLIC BLOOD PRESSURE: 89 MMHG | TEMPERATURE: 98 F | HEIGHT: 62 IN | OXYGEN SATURATION: 93 % | WEIGHT: 232 LBS | RESPIRATION RATE: 16 BRPM

## 2025-02-05 DIAGNOSIS — K21.9 GASTRO-ESOPHAGEAL REFLUX DISEASE W/OUT ESOPHAGITIS: ICD-10-CM

## 2025-02-05 DIAGNOSIS — K76.0 FATTY (CHANGE OF) LIVER, NOT ELSEWHERE CLASSIFIED: ICD-10-CM

## 2025-02-05 DIAGNOSIS — K29.50 UNSPECIFIED CHRONIC GASTRITIS W/OUT BLEEDING: ICD-10-CM

## 2025-02-05 DIAGNOSIS — R10.9 UNSPECIFIED ABDOMINAL PAIN: ICD-10-CM

## 2025-02-05 DIAGNOSIS — K57.30 DIVERTICULOSIS OF LARGE INTESTINE W/OUT PERFORATION OR ABSCESS W/OUT BLEEDING: ICD-10-CM

## 2025-02-05 DIAGNOSIS — K64.8 OTHER HEMORRHOIDS: ICD-10-CM

## 2025-02-05 PROCEDURE — 99214 OFFICE O/P EST MOD 30 MIN: CPT

## 2025-02-05 PROCEDURE — G2211 COMPLEX E/M VISIT ADD ON: CPT | Mod: NC

## 2025-02-10 ENCOUNTER — RESULT REVIEW (OUTPATIENT)
Age: 70
End: 2025-02-10

## 2025-02-15 ENCOUNTER — APPOINTMENT (OUTPATIENT)
Dept: CT IMAGING | Facility: IMAGING CENTER | Age: 70
End: 2025-02-15
Payer: COMMERCIAL

## 2025-02-15 ENCOUNTER — OUTPATIENT (OUTPATIENT)
Dept: OUTPATIENT SERVICES | Facility: HOSPITAL | Age: 70
LOS: 1 days | End: 2025-02-15
Payer: COMMERCIAL

## 2025-02-15 DIAGNOSIS — R10.9 UNSPECIFIED ABDOMINAL PAIN: ICD-10-CM

## 2025-02-15 PROCEDURE — 74177 CT ABD & PELVIS W/CONTRAST: CPT

## 2025-02-15 PROCEDURE — 74177 CT ABD & PELVIS W/CONTRAST: CPT | Mod: 26
